# Patient Record
Sex: MALE | Race: WHITE | NOT HISPANIC OR LATINO | Employment: OTHER | URBAN - METROPOLITAN AREA
[De-identification: names, ages, dates, MRNs, and addresses within clinical notes are randomized per-mention and may not be internally consistent; named-entity substitution may affect disease eponyms.]

---

## 2017-10-26 ENCOUNTER — APPOINTMENT (OUTPATIENT)
Dept: PHYSICAL THERAPY | Facility: HOSPITAL | Age: 72
End: 2017-10-26

## 2017-10-27 ENCOUNTER — HOSPITAL ENCOUNTER (OUTPATIENT)
Dept: PHYSICAL THERAPY | Facility: HOSPITAL | Age: 72
Setting detail: THERAPIES SERIES
Discharge: HOME | End: 2017-10-27

## 2017-10-27 DIAGNOSIS — M54.2 NECK PAIN: Primary | ICD-10-CM

## 2017-10-27 DIAGNOSIS — R26.9 ALTERED GAIT: ICD-10-CM

## 2017-10-27 DIAGNOSIS — M25.50 ARTHRALGIA, UNSPECIFIED JOINT: ICD-10-CM

## 2017-10-27 DIAGNOSIS — M15.9 OSTEOARTHROSIS, GENERALIZED, INVOLVING MULTIPLE SITES: ICD-10-CM

## 2017-10-27 PROCEDURE — 97162 PT EVAL MOD COMPLEX 30 MIN: CPT

## 2017-10-27 PROCEDURE — 97110 THERAPEUTIC EXERCISES: CPT

## 2017-10-27 PROCEDURE — G8982 BODY POS GOAL STATUS: HCPCS

## 2017-10-27 PROCEDURE — G8981 BODY POS CURRENT STATUS: HCPCS
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
## 2017-10-31 ENCOUNTER — HOSPITAL ENCOUNTER (OUTPATIENT)
Dept: PHYSICAL THERAPY | Facility: HOSPITAL | Age: 72
Setting detail: THERAPIES SERIES
Discharge: HOME | End: 2017-10-31

## 2017-10-31 DIAGNOSIS — M54.2 NECK PAIN: Primary | ICD-10-CM

## 2017-10-31 DIAGNOSIS — M15.9 OSTEOARTHROSIS, GENERALIZED, INVOLVING MULTIPLE SITES: ICD-10-CM

## 2017-10-31 DIAGNOSIS — R26.9 ALTERED GAIT: ICD-10-CM

## 2017-10-31 DIAGNOSIS — M25.50 ARTHRALGIA, UNSPECIFIED JOINT: ICD-10-CM

## 2017-10-31 PROCEDURE — 97140 MANUAL THERAPY 1/> REGIONS: CPT

## 2017-11-02 ENCOUNTER — HOSPITAL ENCOUNTER (OUTPATIENT)
Dept: PHYSICAL THERAPY | Facility: HOSPITAL | Age: 72
Setting detail: THERAPIES SERIES
Discharge: HOME | End: 2017-11-02

## 2017-11-02 DIAGNOSIS — M15.9 OSTEOARTHROSIS, GENERALIZED, INVOLVING MULTIPLE SITES: ICD-10-CM

## 2017-11-02 DIAGNOSIS — M54.2 NECK PAIN: Primary | ICD-10-CM

## 2017-11-02 DIAGNOSIS — R26.9 ALTERED GAIT: ICD-10-CM

## 2017-11-02 DIAGNOSIS — M25.50 ARTHRALGIA, UNSPECIFIED JOINT: ICD-10-CM

## 2017-11-02 PROCEDURE — 97140 MANUAL THERAPY 1/> REGIONS: CPT

## 2017-11-02 NOTE — THERAPY TREATMENT NOTE
"    Outpatient Physical Therapy Ortho Treatment Note  [facility]     Patient Name: Roque Lepe  : 1945  MRN: [id number]  Today's Date: 2017      Visit Date: 2017    Visit Dx:    ICD-10-CM ICD-9-CM   1. Neck pain M54.2 723.1   2. Arthralgia, unspecified joint M25.50 719.40   3. Osteoarthrosis, generalized, involving multiple sites M15.9 715.09   4. Altered gait R26.9 781.2       There is no problem list on file for this patient.       Past Medical History:   Diagnosis Date   • Arthritis    • Coronary artery disease    • Diabetes mellitus    • HA (headache)    • Hypertension         Past Surgical History:   Procedure Laterality Date   • JOINT REPLACEMENT Bilateral     JENNIFER   • PACEMAKER IMPLANTATION               PT Ortho       17 1100    Subjective Comments    Subjective Comments Patient reports \"it may be a hair better.\"  \"I slept a little better last night.\"    -LN    Precautions and Contraindications    Precautions/Limitations pacemaker  -LN    Precautions NO ES or US SECONDARY TO PACEMAKER  -LN    Contraindications NO ES SECONDARY TO PACEMAKER  -LN    Subjective Pain    Able to rate subjective pain? yes  -LN    Pre-Treatment Pain Level 5  -LN    Subjective Pain Comment Occasional head ache this morning but not bad per patient.    -LN      10/31/17 1109    Subjective Comments    Subjective Comments Patient reports feeling \"so-so\". Patient woke up about at 3:00 am and c/o significant pain in neck and shoulders and he couldn't get back to sleep. \"I do that every night.\"  \"The tape may have helped but every so often it really itched.   -LN    Precautions and Contraindications    Precautions/Limitations pacemaker  -LN    Precautions NO ES SECONDARY TO PACEMAKER  -LN    Contraindications NO ES SECONDARY TO PACEMAKER  -LN    Subjective Pain    Able to rate subjective pain? yes  -LN    Pre-Treatment Pain Level 6  -LN    Subjective Pain Comment No c/o HA today.   -LN    Posture/Observations    " "Posture/Observations Comments Removed tape and no redness noted or skin irritation noted.   -LN    Neck    Flexion AROM Deficit 75%  -LN    Extension AROM Deficit 75%  -LN    Lt Lat Flexion AROM Deficit 75%  -LN    Rt Lateral Flexion AROM Deficit 50%  -LN    Lt Rotation AROM Deficit 50%  -LN    Rt Rotation AROM Deficit 50%  -LN      User Key  (r) = Recorded By, (t) = Taken By, (c) = Cosigned By    Initials Name Provider Type    ANTONIO Cates, PT Physical Therapist                            PT Assessment/Plan       11/02/17 1200       PT Assessment    Assessment Comments Patient with overall decreased c/o pain and decreased HA. Patient still with moderate muscle guading with tenderness/trigger points along right UT/midscapula area/OA area. Nominal muscle guarding noted on left side. He is doing well with HEP. CROM improving but still decreased left cervical SB and left cervical rotation. He was able to SB to left further with Kinesiotape on right side.   -LN     PT Plan    PT Frequency 2x/week  -LN     PT Plan Comments Continue per P.O.C. Assess benefit & tolerance to Kinesiotape. Progress with exercises as tolerated.   -LN       User Key  (r) = Recorded By, (t) = Taken By, (c) = Cosigned By    Initials Name Provider Type    ANTONIO Cates, PT Physical Therapist                Modalities       11/02/17 1100          Moist Heat    MH Applied Yes  -LN      Location Bilateral UT/shoulders and back with patient supine in hooklying.  -LN      Rx Minutes 15 mins  -LN      MH Prior to Rx Yes  -LN      Other Treatment Provided    Taping / Bracing 1 \"I\" strip applied for right UT inhibition and 1 \"I\" strip applied along right midscapula muscles. Patient to leave tape on up to 5 days but to trim/remove tape as needed or if it causes a lot of itching. Cautioned patient to remove tape easily if he does decide to take it off.   -LN        User Key  (r) = Recorded By, (t) = Taken By, (c) = Cosigned By    " "Initials Name Provider Type    ANTONIO Cates, PT Physical Therapist                Exercises       11/02/17 1100          Subjective Comments    Subjective Comments Patient reports \"it may be a hair better.\"  \"I slept a little better last night.\"    -LN      Subjective Pain    Able to rate subjective pain? yes  -LN      Pre-Treatment Pain Level 5  -LN      Subjective Pain Comment Ocassional head ache this morning.   -LN      Exercise 1    Exercise Name 1 Verbally reviewed HEP.   -LN      Cueing 1 --  -LN      Reps 1 --  -LN      Time (Seconds) 1 --  -LN      Exercise 2    Exercise Name 2 --  -LN      Reps 2 --  -LN      Exercise 3    Exercise Name 3 --  -LN      Reps 3 --  -LN      Exercise 4    Exercise Name 4 --  -LN      Reps 4 --  -LN      Time (Seconds) 4 --  -LN        User Key  (r) = Recorded By, (t) = Taken By, (c) = Cosigned By    Initials Name Provider Type    ANTONIO Cates, PT Physical Therapist                        Manual Rx (last 36 hours)      Manual Treatments       11/02/17 1100          Manual Rx 1    Manual Rx 1 Location Bilateral UT/levator/cervical PS/OA area with patient seated in chair  -LN      Manual Rx 1 Type STM/trigger point massage with patient seated in chair  -LN      Manual Rx 1 Duration 10 minutes  -LN      Manual Rx 2    Manual Rx 2 Location gentle manual Cervical traction in sitting  -LN      Manual Rx 2 Duration 5 x 10 seconds  -LN      Manual Rx 3    Manual Rx 3 Location AA cervical rotation   seated  -LN      Manual Rx 3 Duration 3 x each  -LN      Manual Rx 4    Manual Rx 4 Location AA cervical SB   seated  -LN      Manual Rx 4 Duration 3 x each  -LN      Manual Rx 5    Manual Rx 5 Location active cervical flexion  -LN      Manual Rx 5 Duration 3 x  -LN        User Key  (r) = Recorded By, (t) = Taken By, (c) = Cosigned By    Initials Name Provider Type    ANTONIO Cates, PT Physical Therapist                    Therapy Education       " 11/02/17 1221          Therapy Education    Given HEP;Symptoms/condition management;Pain management  -LN      Program Reinforced  -LN      How Provided Verbal;Demonstration  -LN      Provided to Patient  -LN      Level of Understanding Teach back education performed;Verbalized;Demonstrated  -LN        User Key  (r) = Recorded By, (t) = Taken By, (c) = Cosigned By    Initials Name Provider Type    LN Daphne Cates, PT Physical Therapist                Time Calculation:   Start Time: 1115  Stop Time: 1210  Time Calculation (min): 55 min    Therapy Charges for Today     Code Description Service Date Service Provider Modifiers Qty    63532543807 HC PT HOT OR COLD PACK TREAT MCARE 11/2/2017 Daphne Cates, PT GP 1    61905503759 HC PT MANUAL THERAPY EA 15 MIN 11/2/2017 Daphne Cates, PT GP 2                    Daphne Cates, PT  11/2/2017

## 2017-11-07 ENCOUNTER — HOSPITAL ENCOUNTER (OUTPATIENT)
Dept: PHYSICAL THERAPY | Facility: HOSPITAL | Age: 72
Setting detail: THERAPIES SERIES
Discharge: HOME | End: 2017-11-07

## 2017-11-07 DIAGNOSIS — M54.2 NECK PAIN: Primary | ICD-10-CM

## 2017-11-07 DIAGNOSIS — M15.9 OSTEOARTHROSIS, GENERALIZED, INVOLVING MULTIPLE SITES: ICD-10-CM

## 2017-11-07 DIAGNOSIS — M25.50 ARTHRALGIA, UNSPECIFIED JOINT: ICD-10-CM

## 2017-11-07 PROCEDURE — 97140 MANUAL THERAPY 1/> REGIONS: CPT

## 2017-11-07 NOTE — THERAPY TREATMENT NOTE
"    Outpatient Physical Therapy Ortho Treatment Note   [place]     Patient Name: Roque Lepe  : 1945  MRN: [id number]  Today's Date: 2017      Visit Date: 2017    Visit Dx:    ICD-10-CM ICD-9-CM   1. Neck pain M54.2 723.1   2. Arthralgia, unspecified joint M25.50 719.40   3. Osteoarthrosis, generalized, involving multiple sites M15.9 715.09       There is no problem list on file for this patient.       Past Medical History:   Diagnosis Date   • Arthritis    • Coronary artery disease    • Diabetes mellitus    • HA (headache)    • Hypertension         Past Surgical History:   Procedure Laterality Date   • JOINT REPLACEMENT Bilateral     JENNIFER   • PACEMAKER IMPLANTATION               PT Ortho       17 1100    Subjective Comments    Subjective Comments Pt reports he has been awake since 3 am because he can't tolerate lying down; pt reports his skin has been \"a little itchy\"; pt denies any questions with HEP  -    Precautions and Contraindications    Precautions/Limitations pacemaker  -    Precautions NO ES or US SECONDARY TO PACEMAKER  -    Contraindications NO ES SECONDARY TO PACEMAKER  -    Subjective Pain    Able to rate subjective pain? yes  -    Pre-Treatment Pain Level 6  -    Subjective Pain Comment Pt reports his pain is constant and never goes below a 5/10  -    Posture/Observations    Posture/Observations Comments Tape removed - no irritation visible  -      User Key  (r) = Recorded By, (t) = Taken By, (c) = Cosigned By    Initials Name Provider Type     Rolly Wolff, PTA Physical Therapy Assistant                            PT Assessment/Plan       17 1204       PT Assessment    Assessment Comments Pt with decreased tightness noted especially (R) UT after treatment; pt reporting being \"a hair better\" ; did not reapply tape this visit in order to decrease risk of skin irritation following pt's complaints of itching - plan to reapply at next visit  -     PT " Plan    PT Plan Comments Cont per POC - reapply kinesiotape next visit if pt requests  -       User Key  (r) = Recorded By, (t) = Taken By, (c) = Cosigned By    Initials Name Provider Type     Rolly Wolff PTA Physical Therapy Assistant                Modalities       11/07/17 1100          Moist Heat    Location Bilateral UT/shoulders and back with patient supine in hooklying.  -      Rx Minutes 15 mins  -[facility] Prior to Rx Yes  -        User Key  (r) = Recorded By, (t) = Taken By, (c) = Cosigned By    Initials Name Provider Type     Rolly Wolff PTA Physical Therapy Assistant                                  Manual Rx (last 36 hours)      Manual Treatments       11/07/17 1100          Manual Rx 1    Manual Rx 1 Location Bilateral UT/levator/cervical PS/OA area with patient seated in chair  -      Manual Rx 1 Type STM/trigger point massage with patient seated in chair  -      Manual Rx 1 Duration 10 minutes  -      Manual Rx 2    Manual Rx 2 Location gentle manual Cervical traction in sitting  -      Manual Rx 2 Duration 5 x 10 seconds  -      Manual Rx 3    Manual Rx 3 Location AA cervical rotation   seated  -      Manual Rx 3 Duration 3 x each  -      Manual Rx 4    Manual Rx 4 Location AA cervical SB   seated  -      Manual Rx 4 Duration 3 x each  -      Manual Rx 5    Manual Rx 5 Location active cervical flexion  -      Manual Rx 5 Duration 3 x  -        User Key  (r) = Recorded By, (t) = Taken By, (c) = Cosigned By    Initials Name Provider Type     Rolly Shady Wolff PTA Physical Therapy Assistant                    Therapy Education       11/07/17 1204          Therapy Education    Given HEP;Symptoms/condition management  -      Program Reinforced  -      How Provided Verbal  -      Provided to Patient  -      Level of Understanding Teach back education performed;Verbalized  -        User Key  (r) = Recorded By, (t) = Taken By, (c) = Cosigned By    Initials  Name Provider Type     Rolly Wolff PTA Physical Therapy Assistant                Time Calculation:   Start Time: 1102  Stop Time: 1157  Time Calculation (min): 55 min    Therapy Charges for Today     Code Description Service Date Service Provider Modifiers Qty    21297515676 HC PT MANUAL THERAPY EA 15 MIN 11/7/2017 Rolly Wolff PTA GP 1    41774255681 HC PT HOT OR COLD PACK TREAT [facility]ARE 11/7/2017 Rolly Wolff PTA GP 1                    Rolly Wolff PTA  11/7/2017

## 2017-11-09 ENCOUNTER — HOSPITAL ENCOUNTER (OUTPATIENT)
Dept: PHYSICAL THERAPY | Facility: HOSPITAL | Age: 72
Setting detail: THERAPIES SERIES
Discharge: HOME | End: 2017-11-09

## 2017-11-09 DIAGNOSIS — M25.50 ARTHRALGIA, UNSPECIFIED JOINT: ICD-10-CM

## 2017-11-09 DIAGNOSIS — R26.9 ALTERED GAIT: ICD-10-CM

## 2017-11-09 DIAGNOSIS — M54.2 NECK PAIN: Primary | ICD-10-CM

## 2017-11-09 DIAGNOSIS — M15.9 OSTEOARTHROSIS, GENERALIZED, INVOLVING MULTIPLE SITES: ICD-10-CM

## 2017-11-09 PROCEDURE — 97140 MANUAL THERAPY 1/> REGIONS: CPT
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
## 2017-11-14 ENCOUNTER — HOSPITAL ENCOUNTER (OUTPATIENT)
Dept: PHYSICAL THERAPY | Facility: HOSPITAL | Age: 72
Setting detail: THERAPIES SERIES
Discharge: HOME | End: 2017-11-14

## 2017-11-14 DIAGNOSIS — M54.2 NECK PAIN: Primary | ICD-10-CM

## 2017-11-14 PROCEDURE — 97110 THERAPEUTIC EXERCISES: CPT

## 2017-11-14 PROCEDURE — 97140 MANUAL THERAPY 1/> REGIONS: CPT

## 2017-11-14 NOTE — THERAPY TREATMENT NOTE
"    Outpatient Physical Therapy Ortho Treatment Note   [place]     Patient Name: Roque Lepe  : 1945  MRN: [id number]  Today's Date: 2017      Visit Date: 2017    Visit Dx:    ICD-10-CM ICD-9-CM   1. Neck pain M54.2 723.1       There is no problem list on file for this patient.       Past Medical History:   Diagnosis Date   • Arthritis    • Coronary artery disease    • Diabetes mellitus    • HA (headache)    • Hypertension         Past Surgical History:   Procedure Laterality Date   • JOINT REPLACEMENT Bilateral     JENNIFER   • PACEMAKER IMPLANTATION               PT Ortho       17 1400    Subjective Comments    Subjective Comments \"I saw the doctor and she is going to have me see a neck specialist.\"   \"I feel better after therapy and after I take a hot shower but then the muscles tighten back up.\"  \"She said it was okay to use heat on my hip.\"  -LN    Precautions and Contraindications    Precautions/Limitations pacemaker  -LN    Precautions NO ES or US SECONDARY TO PACEMAKER  -LN    Contraindications NO ES SECONDARY TO PACEMAKER  -LN    Subjective Pain    Able to rate subjective pain? yes  -LN    Pre-Treatment Pain Level 4  -LN    Subjective Pain Comment Patient reports that his hip isn't hurting right now-\" it hurts when I walk a lot or stand a lot.\"   -LN    Posture/Observations    Posture/Observations Comments Tape removed - no irritation visible.   -LN      User Key  (r) = Recorded By, (t) = Taken By, (c) = Cosigned By    Initials Name Provider Type    LN Daphne Cates, PT Physical Therapist                            PT Assessment/Plan       17 1400       PT Assessment    Assessment Comments Patient with overall decreased muscle tightness but minimal to moderate still persists with a few tender trigger points along right UT and midscapula area.  He is tolerating gentle CROM and stretching well with only c/o minimal pain/discomfort right UT/levator area.   -LN     PT " "Plan    PT Frequency 2x/week  -LN     PT Plan Comments Continue per P.O.C.  No new orders on hip yet. Patient waiting to hear from his MD about when his appointment with neck specialist will be.    -LN       User Key  (r) = Recorded By, (t) = Taken By, (c) = Cosigned By    Initials Name Provider Type    ANTONIO Cates, PT Physical Therapist                Modalities       11/14/17 1400          Moist Heat    MH Applied Yes  -LN      Location Bilateral UT/shoulders and back with patient supine in hooklying.   with HOB elevated  -LN      Rx Minutes 15 mins  -LN      [facility] Prior to Rx Yes  -LN        User Key  (r) = Recorded By, (t) = Taken By, (c) = Cosigned By    Initials Name Provider Type    ANTONIO Cates, PT Physical Therapist                Exercises       11/14/17 1400          Subjective Comments    Subjective Comments \"I saw the doctor and she is going to have me see a neck specialist.\"   \"I feel better after therapy and after I take a hot shower but then the muscles tighten back up.\"   -LN      Subjective Pain    Able to rate subjective pain? yes  -LN      Pre-Treatment Pain Level 4  -LN      Subjective Pain Comment Patient reports that his hip isn't hurting right now- it hurts when I walk a lot or stand a lot.\"   -LN      Exercise 1    Exercise Name 1 Verbally reviewed HEP.   -LN      Exercise 2    Exercise Name 2 scapular adduction  -LN      Reps 2 5  -LN      Time (Seconds) 2 5 seconds  -LN      Exercise 3    Exercise Name 3 right posterior capsule stretch  -LN      Reps 3 5  -LN      Time (Seconds) 3 5 seconds  -LN        User Key  (r) = Recorded By, (t) = Taken By, (c) = Cosigned By    Initials Name Provider Type    ANTONIO Cates PT Physical Therapist                        Manual Rx (last 36 hours)      Manual Treatments       11/14/17 1500          Manual Rx 1    Manual Rx 1 Location Bilateral UT/levator/cervical PS/OA area with patient seated in chair  -LN      Manual " Rx 1 Type STM/trigger point massage with patient seated in chair  -LN      Manual Rx 1 Duration 10 minutes  -LN      Manual Rx 2    Manual Rx 2 Location gentle manual Cervical traction in sitting  -LN      Manual Rx 2 Duration 6 x 10 seconds  -LN      Manual Rx 3    Manual Rx 3 Location AA cervical rotation   seated  -LN      Manual Rx 3 Duration 3 x each  -LN      Manual Rx 4    Manual Rx 4 Location AA cervical SB   seated; with gentle passive UT stretch  -LN      Manual Rx 4 Duration 3 x each  -LN      Manual Rx 5    Manual Rx 5 Location active cervical flexion  -LN      Manual Rx 5 Duration 3 x  -LN      Manual Rx 6    Manual Rx 6 Location AA levator stretch  -LN      Manual Rx 6 Duration 3 x each x 5 seconds   -LN        User Key  (r) = Recorded By, (t) = Taken By, (c) = Cosigned By    Initials Name Provider Type    LN Daphne Cates, PT Physical Therapist                    Therapy Education       11/14/17 1549          Therapy Education    Given HEP;Symptoms/condition management;Pain management  -LN      Program New   added right posterior capsule stretch  -LN      How Provided Verbal;Demonstration;Written  -LN      Provided to Patient  -LN      Level of Understanding Teach back education performed;Verbalized;Demonstrated  -LN        User Key  (r) = Recorded By, (t) = Taken By, (c) = Cosigned By    Initials Name Provider Type    LN Daphne Cates, PT Physical Therapist                Time Calculation:   Start Time: 1425  Stop Time: 1530  Time Calculation (min): 65 min    Therapy Charges for Today     Code Description Service Date Service Provider Modifiers Qty    65075601942 HC PT HOT OR COLD PACK TREAT [facility] 11/14/2017 Daphne Cates, PT GP 1    23246024843 HC PT MANUAL THERAPY EA 15 MIN 11/14/2017 Daphne Cates, PT GP 1    41071920355 HC PT THER PROC EA 15 MIN 11/14/2017 Daphne Cates, PT GP 1                    Daphne Cates, PT  11/14/2017

## 2017-11-16 ENCOUNTER — HOSPITAL ENCOUNTER (OUTPATIENT)
Dept: PHYSICAL THERAPY | Facility: HOSPITAL | Age: 72
Setting detail: THERAPIES SERIES
Discharge: HOME | End: 2017-11-16

## 2017-11-16 DIAGNOSIS — M15.9 OSTEOARTHROSIS, GENERALIZED, INVOLVING MULTIPLE SITES: ICD-10-CM

## 2017-11-16 DIAGNOSIS — M25.50 ARTHRALGIA, UNSPECIFIED JOINT: ICD-10-CM

## 2017-11-16 DIAGNOSIS — R26.9 ALTERED GAIT: ICD-10-CM

## 2017-11-16 DIAGNOSIS — M54.2 NECK PAIN: Primary | ICD-10-CM

## 2017-11-16 PROCEDURE — 97140 MANUAL THERAPY 1/> REGIONS: CPT

## 2017-11-16 PROCEDURE — 97110 THERAPEUTIC EXERCISES: CPT

## 2017-11-16 NOTE — THERAPY TREATMENT NOTE
"    Outpatient Physical Therapy Ortho Treatment Note  [facility]     Patient Name: Roque Lepe  : 1945  MRN: [id number]  Today's Date: 2017      Visit Date: 2017    Visit Dx:    ICD-10-CM ICD-9-CM   1. Neck pain M54.2 723.1   2. Arthralgia, unspecified joint M25.50 719.40   3. Osteoarthrosis, generalized, involving multiple sites M15.9 715.09   4. Altered gait R26.9 781.2       There is no problem list on file for this patient.       Past Medical History:   Diagnosis Date   • Arthritis    • Coronary artery disease    • Diabetes mellitus    • HA (headache)    • Hypertension         Past Surgical History:   Procedure Laterality Date   • JOINT REPLACEMENT Bilateral     JENNIFER   • PACEMAKER IMPLANTATION               PT Ortho       17 1100    Subjective Comments    Subjective Comments \"I'm about the same; I'm not any worse.\" \"I felt better for a little while after the last visit.\"   -LN    Precautions and Contraindications    Precautions/Limitations pacemaker  -LN    Precautions NO ES or US SECONDARY TO PACEMAKER  -LN    Contraindications NO ES SECONDARY TO PACEMAKER  -LN    Subjective Pain    Able to rate subjective pain? yes  -LN    Pre-Treatment Pain Level 4  -LN    Subjective Pain Comment \"My hip is about the same.\"    Patient reports muscles feel looser after treatment.   -LN    Neck    Flexion AROM Deficit 75%  -LN    Extension AROM Deficit 75%  -LN    Lt Lat Flexion AROM Deficit 75%  -LN    Rt Lateral Flexion AROM Deficit 50%  -LN    Lt Rotation AROM Deficit 75%  -LN    Rt Rotation AROM Deficit 50%  -LN      17 1400    Subjective Comments    Subjective Comments \"I saw the doctor and she is going to have me see a neck specialist.\"   \"I feel better after therapy and after I take a hot shower but then the muscles tighten back up.\"   -LN    Precautions and Contraindications    Precautions/Limitations pacemaker  -LN    Precautions NO ES or US SECONDARY TO PACEMAKER  -LN    " "Contraindications NO ES SECONDARY TO PACEMAKER  -LN    Subjective Pain    Able to rate subjective pain? yes  -LN    Pre-Treatment Pain Level 4  -LN    Subjective Pain Comment Patient reports that his hip isn't hurting right now- it hurts when I walk a lot or stand a lot.\"   -LN    Posture/Observations    Posture/Observations Comments Tape removed - no irritation visible.   -LN      User Key  (r) = Recorded By, (t) = Taken By, (c) = Cosigned By    Initials Name Provider Type    ANTONIO Cates, PT Physical Therapist                            PT Assessment/Plan       11/16/17 1200       PT Assessment    Assessment Comments Patient with no significant change in reported symptoms but CROM is improved except still limited in right sidebending and right rotation. Overall decreased muscle tightness, but minimal persists with tender trigger points noted right UT and MT. Decreased tightness after treatment.   -LN     PT Plan    PT Frequency 1x/week;2x/week  -LN     PT Plan Comments Continue per P.O.C.  Assess tolerance and benefit of Kinesiotape.  -LN       User Key  (r) = Recorded By, (t) = Taken By, (c) = Cosigned By    Initials Name Provider Type    ANTONIO Cates, PT Physical Therapist                Modalities       11/16/17 1100          Moist Heat    MH Applied Yes  -LN      Location Bilateral UT/shoulders and back with patient supine in hooklying.   with HOB elevated  -LN      Rx Minutes 15 mins  -LN      MH Prior to Rx Yes  -LN      Other Treatment Provided    Taping / Bracing 1 \"I\" strip applied for right UT inhibition and 1 \"I\" strip applied along right midscapula muscles.  Patient to leave tape on up to 5 days but to trim/remove tape as needed or if it causes a lot of itching. Cautioned patient to remove tape easily if he does decide to take it off.   -LN        User Key  (r) = Recorded By, (t) = Taken By, (c) = Cosigned By    Initials Name Provider Type    ANTONIO Cates PT " "Physical Therapist                Exercises       11/16/17 1100          Subjective Comments    Subjective Comments \"I'm about the same; I'm not any worse.\" \"I felt better for a little while after the last visit.\"   -LN      Subjective Pain    Able to rate subjective pain? yes  -LN      Pre-Treatment Pain Level 4  -LN      Subjective Pain Comment \"My hip is about the same.\"    Patient reports muscles feel looser after treatment.   -LN      Exercise 1    Exercise Name 1 active cervical flexion  -LN      Reps 1 5  -LN      Time (Seconds) 1 5 seconds  -LN      Exercise 2    Exercise Name 2 active cervical rotation  -LN      Reps 2 5  -LN      Time (Seconds) 2 5 seconds  -LN      Exercise 3    Exercise Name 3 active cervical SB  -LN      Reps 3 5  -LN      Exercise 4    Exercise Name 4 levator scapula stretch  -LN      Reps 4 3  -LN      Time (Seconds) 4 5 seconds  -LN      Exercise 5    Exercise Name 5 scapula adduction  -LN      Reps 5 5  -LN      Time (Seconds) 5 5 seconds  -LN      Exercise 6    Exercise Name 6 Posterior capsule stretch  -LN      Reps 6 5   each  -LN      Time (Seconds) 6 5 seconds each  -LN        User Key  (r) = Recorded By, (t) = Taken By, (c) = Cosigned By    Initials Name Provider Type    LN Daphne Cates, PT Physical Therapist                        Manual Rx (last 36 hours)      Manual Treatments       11/16/17 1100          Manual Rx 1    Manual Rx 1 Location Bilateral UT/levator/cervical PS/OA area with patient seated in chair  -LN      Manual Rx 1 Type STM/trigger point massage with patient seated in chair  -LN      Manual Rx 1 Duration 10 minutes  -LN      Manual Rx 2    Manual Rx 2 Location gentle manual Cervical traction in sitting  -LN      Manual Rx 2 Duration 6 x 10 seconds  -LN      Manual Rx 3    Manual Rx 3 Location AA cervical rotation   seated  -LN      Manual Rx 3 Duration 3 x each  -LN      Manual Rx 4    Manual Rx 4 Location AA cervical SB   seated; with gentle " passive UT stretch  -LN      Manual Rx 4 Duration 3 x each  -LN      Manual Rx 5    Manual Rx 5 Location AA cervical flexion   gentle passive stretch at end-range  -LN      Manual Rx 5 Duration 3 x  -LN      Manual Rx 6    Manual Rx 6 Location AA levator stretch  -LN      Manual Rx 6 Duration 3 x each x 5 seconds   -LN      Manual Rx 7    Manual Rx 7 Location Passive cervical side glides as tolerated  -LN      Manual Rx 7 Duration 2 minutes   -LN        User Key  (r) = Recorded By, (t) = Taken By, (c) = Cosigned By    Initials Name Provider Type    ANTONIO Cates, PT Physical Therapist                PT OP Goals       11/16/17 1300       PT Short Term Goals    STG Date to Achieve 11/10/17  -LN     STG 1 Patient to verbally report decreased pain in neck and shoulders to <5/10 with ADLs and everyday activities.  -LN     STG 1 Progress Met  -LN     STG 1 Progress Comments Patient rates pain at 4/10.   -LN     STG 2 Patient to have improved CROM by 25% each plane to allow for improved neck motion with driving and ADLs.  -LN     STG 2 Progress Partially Met  -LN     STG 3 Patient to have decreased c/o HA by 25-50%.   -LN     STG 3 Progress Ongoing;Progressing  -LN     STG 3 Progress Comments Patient still with c/o occasional HA but reports that they don't last very long.   -LN     STG 4 Patient to have decreased muscle guarding in neck/UT/midscapula area to minimal to nominal with improved tolerance to massage and stretching.   -LN     STG 4 Progress Partially Met  -LN     STG 4 Progress Comments Decreased to minimal; but still very tender with trigger points noted right UT and MT area.   -LN       User Key  (r) = Recorded By, (t) = Taken By, (c) = Cosigned By    Initials Name Provider Type    ANTONIO Cates, PT Physical Therapist                Therapy Education       11/16/17 3309          Therapy Education    Education Details Patient to continue with HEP as tolerated.   -LN      Given  HEP;Symptoms/condition management;Pain management  -LN      Program Reinforced  -LN      How Provided Verbal;Demonstration  -LN      Provided to Patient  -LN      Level of Understanding Teach back education performed;Verbalized;Demonstrated  -LN        User Key  (r) = Recorded By, (t) = Taken By, (c) = Cosigned By    Initials Name Provider Type    LN Daphne Cates, PT Physical Therapist                Time Calculation:   Start Time: 1130  Stop Time: 1235  Time Calculation (min): 65 min    Therapy Charges for Today     Code Description Service Date Service Provider Modifiers Qty    48658212918  PT HOT OR COLD PACK TREAT MCARE 11/16/2017 Daphne Cates, PT GP 1    71939727567 HC PT MANUAL THERAPY EA 15 MIN 11/16/2017 Daphne Cates, PT GP 1    [id number]  PT THER PROC EA 15 MIN 11/16/2017 Daphne Cates, PT GP 1                    Daphne Cates, PT  11/16/2017

## 2017-11-21 ENCOUNTER — HOSPITAL ENCOUNTER (OUTPATIENT)
Dept: PHYSICAL THERAPY | Facility: HOSPITAL | Age: 72
Setting detail: THERAPIES SERIES
Discharge: HOME | End: 2017-11-21

## 2017-11-21 DIAGNOSIS — R26.9 ALTERED GAIT: ICD-10-CM

## 2017-11-21 DIAGNOSIS — M25.50 ARTHRALGIA, UNSPECIFIED JOINT: ICD-10-CM

## 2017-11-21 DIAGNOSIS — M54.2 NECK PAIN: Primary | ICD-10-CM

## 2017-11-21 DIAGNOSIS — M15.9 OSTEOARTHROSIS, GENERALIZED, INVOLVING MULTIPLE SITES: ICD-10-CM

## 2017-11-21 PROCEDURE — 97110 THERAPEUTIC EXERCISES: CPT

## 2017-11-21 PROCEDURE — 97140 MANUAL THERAPY 1/> REGIONS: CPT

## 2017-11-21 NOTE — THERAPY TREATMENT NOTE
Outpatient Physical Therapy Ortho Treatment Note  [facility]     Patient Name: Roque Lepe  : 1945  MRN: [id number]  Today's Date: 2017      Visit Date: 2017    Visit Dx:    ICD-10-CM ICD-9-CM   1. Neck pain M54.2 723.1   2. Arthralgia, unspecified joint M25.50 719.40   3. Osteoarthrosis, generalized, involving multiple sites M15.9 715.09   4. Altered gait R26.9 781.2       There is no problem list on file for this patient.       Past Medical History:   Diagnosis Date   • Arthritis    • Coronary artery disease    • Diabetes mellitus    • HA (headache)    • Hypertension         Past Surgical History:   Procedure Laterality Date   • JOINT REPLACEMENT Bilateral     JENNIFER   • PACEMAKER IMPLANTATION                               PT Assessment/Plan       17 1209       PT Assessment    Assessment Comments Pt continues with trigger points and tightness (R) UT/MT but with improvement after treatment; pt reporting his CROM felt more relaxed after session  -     PT Plan    PT Plan Comments Cont per POC  -       User Key  (r) = Recorded By, (t) = Taken By, (c) = Cosigned By    Initials Name Provider Type     Rolly Wolff PTA Physical Therapy Assistant                Modalities       17 1100          Subjective Comments    Subjective Comments Pt states he is about the same but feels he may be doing a little better overall; reports the tape became quite itchy this round and one strip is starting to roll up - pt request to hold tape as in past due to itching  -      Moist Heat    Location Bilateral UT/shoulders and back with patient supine in hooklying.   with HOB elevated  -      Rx Minutes 15 mins  -      MH Prior to Rx Yes  -        User Key  (r) = Recorded By, (t) = Taken By, (c) = Cosigned By    Initials Name Provider Type     Rolly Wolff PTA Physical Therapy Assistant                Exercises       17 1100          Subjective Comments    Subjective Comments Pt  states he is about the same but feels he may be doing a little better overall; reports the tape became quite itchy this round and one strip is starting to roll up - pt request to hold tape as in past due to itching  -      Exercise 1    Exercise Name 1 active cervical flexion  -      Reps 1 5  -      Time (Seconds) 1 5 seconds  -      Exercise 2    Exercise Name 2 active cervical rotation  -      Reps 2 5  -      Time (Seconds) 2 5 seconds  -      Exercise 3    Exercise Name 3 active cervical SB  -      Reps 3 5  -      Exercise 4    Exercise Name 4 levator scapula stretch  -      Reps 4 3  -MH      Time (Seconds) 4 5 seconds  -      Exercise 5    Exercise Name 5 scapula adduction  -      Reps 5 5  -      Time (Seconds) 5 5 seconds  -      Exercise 6    Exercise Name 6 Posterior capsule stretch  -      Reps 6 5   each  -      Time (Seconds) 6 5 seconds each  -        User Key  (r) = Recorded By, (t) = Taken By, (c) = Cosigned By    Initials Name Provider Type     Rolly Wolff, PTA Physical Therapy Assistant                        Manual Rx (last 36 hours)      Manual Treatments       11/21/17 1100          Manual Rx 1    Manual Rx 1 Location Bilateral UT/levator/cervical PS/OA area with patient seated in chair  -      Manual Rx 1 Type STM/trigger point massage with patient seated in chair  -      Manual Rx 1 Duration 10 minutes  -      Manual Rx 2    Manual Rx 2 Location gentle manual Cervical traction in sitting  -      Manual Rx 2 Duration 6 x 10 seconds  -      Manual Rx 3    Manual Rx 3 Location AA cervical rotation   seated  -      Manual Rx 3 Duration 3 x each  -      Manual Rx 4    Manual Rx 4 Location AA cervical SB   seated; with gentle passive UT stretch  -      Manual Rx 4 Duration 3 x each  -      Manual Rx 5    Manual Rx 5 Location AA cervical flexion   gentle passive stretch at end-range  -      Manual Rx 5 Duration 3 x  -      Manual Rx 6     Manual Rx 6 Location AA levator stretch  -MH      Manual Rx 6 Duration 3 x each x 5 seconds   -MH        User Key  (r) = Recorded By, (t) = Taken By, (c) = Cosigned By    Initials Name Provider Type    CHARLENE Wolff PTA Physical Therapy Assistant                    Therapy Education       11/21/17 1209          Therapy Education    Given HEP  -MH      Program Reinforced  -MH      How Provided Verbal  -MH      Provided to Patient  -MH      Level of Understanding Teach back education performed;Verbalized;Demonstrated  -MH        User Key  (r) = Recorded By, (t) = Taken By, (c) = Cosigned By    Initials Name Provider Type    CHARLENE Wolff PTA Physical Therapy Assistant                Time Calculation:   Start Time: 1100  Stop Time: 1202  Time Calculation (min): 62 min    Therapy Charges for Today     Code Description Service Date Service Provider Modifiers Qty    71340841399 HC PT HOT OR COLD PACK TREAT MCARE 11/21/2017 Rolly Wolff PTA GP 1    82284081812 HC PT MANUAL THERAPY EA 15 MIN 11/21/2017 Rolly Wolff PTA GP 1    64307005118 HC PT THER PROC EA 15 MIN 11/21/2017 Rolly Wolff PTA GP 1                    Rolly Wolff PTA  11/21/2017

## 2017-11-28 ENCOUNTER — HOSPITAL ENCOUNTER (OUTPATIENT)
Dept: PHYSICAL THERAPY | Facility: HOSPITAL | Age: 72
Setting detail: THERAPIES SERIES
Discharge: HOME | End: 2017-11-28

## 2017-11-28 DIAGNOSIS — M15.9 OSTEOARTHROSIS, GENERALIZED, INVOLVING MULTIPLE SITES: ICD-10-CM

## 2017-11-28 DIAGNOSIS — M25.50 ARTHRALGIA, UNSPECIFIED JOINT: ICD-10-CM

## 2017-11-28 DIAGNOSIS — M54.2 NECK PAIN: Primary | ICD-10-CM

## 2017-11-28 PROCEDURE — 97110 THERAPEUTIC EXERCISES: CPT

## 2017-11-28 PROCEDURE — 97140 MANUAL THERAPY 1/> REGIONS: CPT
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
## 2017-11-30 ENCOUNTER — HOSPITAL ENCOUNTER (OUTPATIENT)
Dept: PHYSICAL THERAPY | Facility: HOSPITAL | Age: 72
Setting detail: THERAPIES SERIES
Discharge: HOME | End: 2017-11-30

## 2017-11-30 DIAGNOSIS — M54.2 NECK PAIN: Primary | ICD-10-CM

## 2017-11-30 DIAGNOSIS — M15.9 OSTEOARTHROSIS, GENERALIZED, INVOLVING MULTIPLE SITES: ICD-10-CM

## 2017-11-30 DIAGNOSIS — R26.9 ALTERED GAIT: ICD-10-CM

## 2017-11-30 DIAGNOSIS — M25.50 ARTHRALGIA, UNSPECIFIED JOINT: ICD-10-CM

## 2017-11-30 PROCEDURE — G8982 BODY POS GOAL STATUS: HCPCS

## 2017-11-30 PROCEDURE — G8981 BODY POS CURRENT STATUS: HCPCS

## 2017-11-30 PROCEDURE — 97140 MANUAL THERAPY 1/> REGIONS: CPT

## 2017-12-05 ENCOUNTER — DOCUMENTATION (OUTPATIENT)
Dept: PHYSICAL THERAPY | Facility: HOSPITAL | Age: 72
End: 2017-12-05

## 2017-12-05 NOTE — THERAPY TREATMENT NOTE
**MISSED VISIT  NOTE**    Pt arrived to appointment at the wrong time - no appointment available.  Pt is scheduled for 12/7/17

## 2017-12-07 ENCOUNTER — HOSPITAL ENCOUNTER (OUTPATIENT)
Dept: PHYSICAL THERAPY | Facility: HOSPITAL | Age: 72
Setting detail: THERAPIES SERIES
Discharge: HOME | End: 2017-12-07

## 2017-12-07 DIAGNOSIS — M25.50 ARTHRALGIA, UNSPECIFIED JOINT: ICD-10-CM

## 2017-12-07 DIAGNOSIS — M54.2 NECK PAIN: Primary | ICD-10-CM

## 2017-12-07 DIAGNOSIS — R26.9 ALTERED GAIT: ICD-10-CM

## 2017-12-07 DIAGNOSIS — M15.9 OSTEOARTHROSIS, GENERALIZED, INVOLVING MULTIPLE SITES: ICD-10-CM

## 2017-12-07 PROCEDURE — 97140 MANUAL THERAPY 1/> REGIONS: CPT

## 2017-12-07 NOTE — THERAPY TREATMENT NOTE
"    Outpatient Physical Therapy Ortho Treatment Note  ASHER Wiley     Patient Name: Roque Lepe  : 1945  MRN: [id number]  Today's Date: 2017      Visit Date: 2017    Visit Dx:    ICD-10-CM ICD-9-CM   1. Neck pain M54.2 723.1   2. Arthralgia, unspecified joint M25.50 719.40   3. Osteoarthrosis, generalized, involving multiple sites M15.9 715.09   4. Altered gait R26.9 781.2       There is no problem list on file for this patient.       Past Medical History:   Diagnosis Date   • Arthritis    • Coronary artery disease    • Diabetes mellitus    • HA (headache)    • Hypertension         Past Surgical History:   Procedure Laterality Date   • JOINT REPLACEMENT Bilateral     JENNIFER   • PACEMAKER IMPLANTATION               PT Ortho       17 1100    Subjective Comments    Subjective Comments \"I'm a little worse today.\" Patient reported having some dizziness after therapy last time that lasted a couple days.   -LN    Precautions and Contraindications    Precautions/Limitations pacemaker  -LN    Precautions NO ES or US SECONDARY TO PACEMAKER  -LN    Contraindications NO ES SECONDARY TO PACEMAKER  -LN    Subjective Pain    Able to rate subjective pain? yes  -LN    Pre-Treatment Pain Level 5   5-6/10  -LN    Subjective Pain Comment No headache today after session or c/o dizziness.  \"I feel better than when I came in.\"   -LN      User Key  (r) = Recorded By, (t) = Taken By, (c) = Cosigned By    Initials Name Provider Type    LN Daphne Cates, PT Physical Therapist                            PT Assessment/Plan       17 1100       PT Assessment    Assessment Comments Patient continues with c/o persistent soreness in neck, bilateral UT and midscapula areas, R>L. Overall muscle tightness/spasms is decreased  but minimal persists right UT/midscapula area; nominal on left side.  He did report feeling better after session with no c/o HA or dizziness.   -LN     PT Plan    PT Frequency 1x/week;2x/week  " "-LN     PT Plan Comments Continue per P.O.C.   -LN       User Key  (r) = Recorded By, (t) = Taken By, (c) = Cosigned By    Initials Name Provider Type    ANTONIO Cates, PT Physical Therapist                Modalities       12/07/17 1100          Moist Heat    MH Applied Yes  -LN      Location Bilateral UT/shoulders and back with patient supine in hooklying.   with HOB elevated  -LN      Rx Minutes 15 mins  -LN      MH Prior to Rx Yes  -LN      Other Treatment Provided    Taping / Bracing No Kinesiotape done today.  -LN        User Key  (r) = Recorded By, (t) = Taken By, (c) = Cosigned By    Initials Name Provider Type    ANTONIO Cates, PT Physical Therapist                Exercises       12/07/17 1100          Subjective Comments    Subjective Comments \"I'm a little worse today.\" Patient reported having some dizziness after therapy last time that lasted a couple days.   -LN      Subjective Pain    Able to rate subjective pain? yes  -LN      Pre-Treatment Pain Level 5   5-6/10  -LN      Subjective Pain Comment No headache today after session or c/o dizziness.  \"I feel better than when I came in.\"   -LN        User Key  (r) = Recorded By, (t) = Taken By, (c) = Cosigned By    Initials Name Provider Type    ANTONIO Cates, PT Physical Therapist                        Manual Rx (last 36 hours)      Manual Treatments       12/07/17 1100          Manual Rx 1    Manual Rx 1 Location Bilateral UT/levator/cervical PS/OA area with patient seated in chair  -LN      Manual Rx 1 Type STM/trigger point massage with patient seated in chair  -LN      Manual Rx 1 Duration 10 minutes  -LN      Manual Rx 2    Manual Rx 2 Location gentle manual Cervical traction in sitting  -LN      Manual Rx 2 Duration 5 x 10 seconds  -LN      Manual Rx 4    Manual Rx 4 Location AA cervical SB   seated; with gentle passive UT stretch  -LN      Manual Rx 4 Grade gentle  -LN      Manual Rx 4 Duration 2 x each  -LN      " Manual Rx 5    Manual Rx 5 Location gentle A-P mobs C7- T8 with patient seated.   at spinous processes and transverse processess  -LN      Manual Rx 5 Duration 2 mimutes  -LN      Manual Rx 7    Manual Rx 7 Location Passive cervical side glides as tolerated  -LN      Manual Rx 7 Grade gentle  -LN      Manual Rx 7 Duration 2 minutes   -LN        User Key  (r) = Recorded By, (t) = Taken By, (c) = Cosigned By    Initials Name Provider Type    ANTONIO Cates, PT Physical Therapist                    Therapy Education       12/07/17 1159          Therapy Education    Given HEP;Symptoms/condition management;Pain management  -LN      Program Reinforced  -LN      How Provided Verbal  -LN      Provided to Patient  -LN      Level of Understanding Teach back education performed;Verbalized;Demonstrated  -LN        User Key  (r) = Recorded By, (t) = Taken By, (c) = Cosigned By    Initials Name Provider Type    ANTONIO Cates, PT Physical Therapist                Time Calculation:   Start Time: 1100  Stop Time: 1150  Time Calculation (min): 50 min    Therapy Charges for Today     Code Description Service Date Service Provider Modifiers Qty    33220794953 HC PT HOT OR COLD PACK TREAT MCARE 12/7/2017 Daphne Cates, PT GP 1    23167669931 HC PT MANUAL THERAPY EA 15 MIN 12/7/2017 Daphne Cates, PT GP 2                    Daphne Cates, PT  12/7/2017

## 2017-12-12 ENCOUNTER — APPOINTMENT (OUTPATIENT)
Dept: PHYSICAL THERAPY | Facility: HOSPITAL | Age: 72
End: 2017-12-12

## 2017-12-14 ENCOUNTER — HOSPITAL ENCOUNTER (OUTPATIENT)
Dept: PHYSICAL THERAPY | Facility: HOSPITAL | Age: 72
Setting detail: THERAPIES SERIES
Discharge: HOME | End: 2017-12-14

## 2017-12-14 DIAGNOSIS — M25.50 ARTHRALGIA, UNSPECIFIED JOINT: ICD-10-CM

## 2017-12-14 DIAGNOSIS — R26.9 ALTERED GAIT: ICD-10-CM

## 2017-12-14 DIAGNOSIS — M15.9 OSTEOARTHROSIS, GENERALIZED, INVOLVING MULTIPLE SITES: ICD-10-CM

## 2017-12-14 DIAGNOSIS — M54.2 NECK PAIN: Primary | ICD-10-CM

## 2017-12-14 PROCEDURE — 97140 MANUAL THERAPY 1/> REGIONS: CPT

## 2017-12-14 NOTE — THERAPY TREATMENT NOTE
"    Outpatient Physical Therapy Ortho Treatment Note  ASHER Wiley     Patient Name: Roque Lepe  : 1945  MRN: [id number]  Today's Date: 2017      Visit Date: 2017    Visit Dx:    ICD-10-CM ICD-9-CM   1. Neck pain M54.2 723.1   2. Arthralgia, unspecified joint M25.50 719.40   3. Osteoarthrosis, generalized, involving multiple sites M15.9 715.09   4. Altered gait R26.9 781.2       There is no problem list on file for this patient.       Past Medical History:   Diagnosis Date   • Arthritis    • Coronary artery disease    • Diabetes mellitus    • HA (headache)    • Hypertension         Past Surgical History:   Procedure Laterality Date   • JOINT REPLACEMENT Bilateral     JENNIFER   • PACEMAKER IMPLANTATION               PT Ortho       17 1100    Subjective Comments    Subjective Comments \"I feel some tightness in my right shoulder and soreness in my muscle in my left hip.\"   -LN    Precautions and Contraindications    Precautions/Limitations pacemaker  -LN    Precautions NO ES or US SECONDARY TO PACEMAKER  -LN    Contraindications NO ES SECONDARY TO PACEMAKER  -LN    Subjective Pain    Able to rate subjective pain? yes  -LN    Pre-Treatment Pain Level 6   -7/10  -LN    Subjective Pain Comment No c/o headache or dizziness.   -LN      User Key  (r) = Recorded By, (t) = Taken By, (c) = Cosigned By    Initials Name Provider Type    LN Daphne Cates, PT Physical Therapist                            PT Assessment/Plan       17 1100       PT Assessment    Assessment Comments Patient with overall decreased pain level but still with moderate muscle guarding noted right UT/MT/midscapula area with tenderness and trigger points present- decreased to minimal to nominal after treatment. He continues to report feeling benefit from PT.   -LN     PT Plan    PT Frequency 1x/week;2x/week  -LN     PT Plan Comments Continue per P.O.C.  Assess benefit of CP.   -LN       User Key  (r) = Recorded By, (t) = " "Taken By, (c) = Cosigned By    Initials Name Provider Type    ANTONIO Cates, PT Physical Therapist                Modalities       12/14/17 1100          Moist Heat    MH Applied Yes  -LN      Location Bilateral UT/shoulders and back with patient supine in hooklying.   with HOB elevated  -LN      Rx Minutes 15 mins  -LN      MH Prior to Rx Yes  -LN      Ice    Ice Applied Yes  -LN      Location right UT/shoulder/midscapula area with patient seated in chair.  -LN      Rx Minutes 10 mins  -LN      Ice S/P Rx Yes  -LN      Patient reports will apply ice at home to involved area Yes   if it seems to help him  -LN        User Key  (r) = Recorded By, (t) = Taken By, (c) = Cosigned By    Initials Name Provider Type    ANTONIO Cates, PT Physical Therapist                Exercises       12/14/17 1100          Subjective Comments    Subjective Comments \"I feel some tightness in my right shoulder and soreness in my muscle in my left hip.\"   -LN      Subjective Pain    Able to rate subjective pain? yes  -LN      Pre-Treatment Pain Level 6   6-7/10  -LN      Subjective Pain Comment No c/o headache or dizziness.   -LN        User Key  (r) = Recorded By, (t) = Taken By, (c) = Cosigned By    Initials Name Provider Type    ANTONIO Cates, PT Physical Therapist                        Manual Rx (last 36 hours)      Manual Treatments       12/14/17 1100          Manual Rx 1    Manual Rx 1 Location Bilateral UT/levator/cervical PS/OA area with patient seated in chair  -LN      Manual Rx 1 Type STM/trigger point massage with patient seated in chair  -LN      Manual Rx 1 Duration 10 minutes  -LN      Manual Rx 2    Manual Rx 2 Location gentle manual Cervical traction in sitting  -LN      Manual Rx 2 Duration 6 x 10 seconds  -LN      Manual Rx 4    Manual Rx 4 Location AA cervical SB   seated; with gentle passive UT stretch  -LN      Manual Rx 4 Grade gentle  -LN      Manual Rx 4 Duration 3 x each  -LN   "    Manual Rx 5    Manual Rx 5 Location gentle A-P mobs C7- T8 with patient seated.   at spinous processes and transverse processess  -LN      Manual Rx 5 Duration 2 minutes  -LN      Manual Rx 7    Manual Rx 7 Location Passive cervical side glides as tolerated  -LN      Manual Rx 7 Grade gentle  -LN      Manual Rx 7 Duration 2 minutes   -LN        User Key  (r) = Recorded By, (t) = Taken By, (c) = Cosigned By    Initials Name Provider Type    LN Daphne Cates, PT Physical Therapist              Therapy Education  Education Details: Patient instructed to use MH and CP PRN at home.  Given: HEP, Symptoms/condition management, Pain management  Program: Reinforced  How Provided: Verbal  Provided to: Patient  Level of Understanding: Teach back education performed, Verbalized, Demonstrated              Time Calculation:   Start Time: 1100  Stop Time: 1200  Time Calculation (min): 60 min    Therapy Charges for Today     Code Description Service Date Service Provider Modifiers Qty    50249012784 HC PT HOT OR COLD PACK TREAT MCARE 12/14/2017 Daphne Cates, PT GP 2    64655974902 HC PT MANUAL THERAPY EA 15 MIN 12/14/2017 Daphne Cates, PT GP 2                    Daphne Cates, PT  12/14/2017

## 2017-12-19 ENCOUNTER — HOSPITAL ENCOUNTER (OUTPATIENT)
Dept: PHYSICAL THERAPY | Facility: HOSPITAL | Age: 72
Setting detail: THERAPIES SERIES
Discharge: HOME | End: 2017-12-19

## 2017-12-19 DIAGNOSIS — M15.9 OSTEOARTHROSIS, GENERALIZED, INVOLVING MULTIPLE SITES: ICD-10-CM

## 2017-12-19 DIAGNOSIS — R26.9 ALTERED GAIT: ICD-10-CM

## 2017-12-19 DIAGNOSIS — M25.50 ARTHRALGIA, UNSPECIFIED JOINT: ICD-10-CM

## 2017-12-19 DIAGNOSIS — M54.2 NECK PAIN: Primary | ICD-10-CM

## 2017-12-19 PROCEDURE — 97140 MANUAL THERAPY 1/> REGIONS: CPT

## 2017-12-19 NOTE — THERAPY TREATMENT NOTE
"    Outpatient Physical Therapy Ortho Treatment Note  ASHER Wiley     Patient Name: Roque Lepe  : 1945  MRN: [id number]  Today's Date: 2017      Visit Date: 2017    Visit Dx:    ICD-10-CM ICD-9-CM   1. Neck pain M54.2 723.1   2. Arthralgia, unspecified joint M25.50 719.40   3. Osteoarthrosis, generalized, involving multiple sites M15.9 715.09   4. Altered gait R26.9 781.2       There is no problem list on file for this patient.       Past Medical History:   Diagnosis Date   • Arthritis    • Coronary artery disease    • Diabetes mellitus    • HA (headache)    • Hypertension         Past Surgical History:   Procedure Laterality Date   • JOINT REPLACEMENT Bilateral     JENNIFER   • PACEMAKER IMPLANTATION               PT Ortho       17 1100    Subjective Comments    Subjective Comments \"I'm just sore.\"  \"My left hip is sore.\"  \"I guess I'm about the same.\"   -LN    Precautions and Contraindications    Precautions/Limitations seizure precautions  -LN    Precautions NO ES or US SECONDARY TO PACEMAKER  -LN    Contraindications NO ES SECONDARY TO PACEMAKER  -LN    Subjective Pain    Able to rate subjective pain? yes  -LN    Pre-Treatment Pain Level 6  -LN    Subjective Pain Comment No c/o headache- he reports that he felt like a headache was coming on a couple times but never developed into one.   -LN    Neck    Flexion AROM Deficit 75%  -LN    Extension AROM Deficit 75%  -LN    Lt Lat Flexion AROM Deficit WFL  -LN    Rt Lateral Flexion AROM Deficit WFL  -LN    Lt Rotation AROM Deficit WFL  -LN    Rt Rotation AROM Deficit 75%   pain  -LN      User Key  (r) = Recorded By, (t) = Taken By, (c) = Cosigned By    Initials Name Provider Type    LN Daphne Cates, PT Physical Therapist                            PT Assessment/Plan       17 1100       PT Assessment    Assessment Comments Patient continues with decreased muscle tightness noted right UT/MT area, but  to touch. Still " "with limited cervical rotation to right and painful, otherwise CROM improved. Soreness persists in left hip.   -LN     PT Plan    PT Frequency 1x/week;2x/week  -LN     PT Plan Comments Continue per P.O.C.   -LN       User Key  (r) = Recorded By, (t) = Taken By, (c) = Cosigned By    Initials Name Provider Type    ANTONIO Cates, PT Physical Therapist                Modalities       12/19/17 1100          Moist Heat    MH Applied Yes  -LN      Location Bilateral UT/shoulders and back with patient supine in hooklying.   with HOB elevated  -LN      Rx Minutes 15 mins  -LN      MH Prior to Rx Yes  -LN      Ice    Ice Applied Yes  -LN      Location right UT/shoulder/midscapula area with patient seated in chair.  -LN      Rx Minutes 10 mins  -LN      Ice S/P Rx Yes  -LN      Patient reports will apply ice at home to involved area Yes   if it seems to help him  -LN        User Key  (r) = Recorded By, (t) = Taken By, (c) = Cosigned By    Initials Name Provider Type    ANTONIO Cates, PT Physical Therapist                Exercises       12/19/17 1100          Subjective Comments    Subjective Comments \"I'm just sore.\"  \"My left hip is sore.\"  \"I guess I'm about the same.\"   -LN      Subjective Pain    Able to rate subjective pain? yes  -LN      Pre-Treatment Pain Level 6  -LN      Subjective Pain Comment No c/o headache- he reports that he felt like a headache was coming on a couple times but never developed into one.   -LN        User Key  (r) = Recorded By, (t) = Taken By, (c) = Cosigned By    Initials Name Provider Type    ANTONIO Cates, PT Physical Therapist                        Manual Rx (last 36 hours)      Manual Treatments       12/19/17 1100          Manual Rx 1    Manual Rx 1 Location Bilateral UT/levator/cervical PS/OA area with patient seated in chair  -LN      Manual Rx 1 Type STM/trigger point massage with patient seated in chair  -LN      Manual Rx 1 Duration 10 minutes  -LN  "     Manual Rx 2    Manual Rx 2 Location gentle manual Cervical traction in sitting  -LN      Manual Rx 2 Duration 6 x 10 seconds  -LN      Manual Rx 4    Manual Rx 4 Location AA cervical SB   seated; with gentle passive UT stretch  -LN      Manual Rx 4 Grade gentle  -LN      Manual Rx 4 Duration 3 x each  -LN      Manual Rx 5    Manual Rx 5 Location gentle A-P mobs C7- T8 with patient seated.   at spinous processes and transverse processess  -LN      Manual Rx 5 Duration 2 minutes  -LN      Manual Rx 7    Manual Rx 7 Location Passive cervical side glides as tolerated  -LN      Manual Rx 7 Grade gentle  -LN      Manual Rx 7 Duration 2 minutes   -LN        User Key  (r) = Recorded By, (t) = Taken By, (c) = Cosigned By    Initials Name Provider Type    LN Daphne Cates, PT Physical Therapist              Therapy Education  Education Details: Spoke to patient again about looking into purchasing a MHP for home.   Given: HEP, Symptoms/condition management, Pain management  Program: Reinforced  How Provided: Verbal  Provided to: Patient  Level of Understanding: Teach back education performed, Verbalized, Demonstrated              Time Calculation:   Start Time: 1100  Stop Time: 1200  Time Calculation (min): 60 min    Therapy Charges for Today     Code Description Service Date Service Provider Modifiers Qty    95761291698 HC PT HOT OR COLD PACK TREAT MCARE 12/19/2017 Daphne Cates, PT GP 2    35212213973 HC PT MANUAL THERAPY EA 15 MIN 12/19/2017 Daphne Cates, PT GP 2                    Daphne Cates, PT  12/19/2017

## 2017-12-21 ENCOUNTER — HOSPITAL ENCOUNTER (OUTPATIENT)
Dept: PHYSICAL THERAPY | Facility: HOSPITAL | Age: 72
Setting detail: THERAPIES SERIES
Discharge: HOME | End: 2017-12-21

## 2017-12-21 DIAGNOSIS — R26.9 ALTERED GAIT: ICD-10-CM

## 2017-12-21 DIAGNOSIS — M54.2 NECK PAIN: Primary | ICD-10-CM

## 2017-12-21 DIAGNOSIS — M25.50 ARTHRALGIA, UNSPECIFIED JOINT: ICD-10-CM

## 2017-12-21 DIAGNOSIS — M15.9 OSTEOARTHROSIS, GENERALIZED, INVOLVING MULTIPLE SITES: ICD-10-CM

## 2017-12-21 PROCEDURE — 97140 MANUAL THERAPY 1/> REGIONS: CPT

## 2017-12-21 NOTE — THERAPY TREATMENT NOTE
"    Outpatient Physical Therapy Ortho Treatment Note  ASHER Wiley     Patient Name: Roque Lepe  : 1945  MRN: [id number]  Today's Date: 2017      Visit Date: 2017    Visit Dx:    ICD-10-CM ICD-9-CM   1. Neck pain M54.2 723.1   2. Arthralgia, unspecified joint M25.50 719.40   3. Osteoarthrosis, generalized, involving multiple sites M15.9 715.09   4. Altered gait R26.9 781.2       There is no problem list on file for this patient.       Past Medical History:   Diagnosis Date   • Arthritis    • Coronary artery disease    • Diabetes mellitus    • HA (headache)    • Hypertension         Past Surgical History:   Procedure Laterality Date   • JOINT REPLACEMENT Bilateral     JENNIFER   • PACEMAKER IMPLANTATION               PT Ortho       17 1100    Subjective Comments    Subjective Comments \"I may be a little better.\"  \"I felt better when I left here the other day for about 3 hours.\"  \"I'm still working on getting an appointment with an neck specialist.\"  -LN    Precautions and Contraindications    Precautions/Limitations pacemaker  -LN    Precautions NO ES or US SECONDARY TO PACEMAKER  -LN    Contraindications NO ES SECONDARY TO PACEMAKER  -LN    Subjective Pain    Able to rate subjective pain? yes  -LN    Pre-Treatment Pain Level 5  -LN    Subjective Pain Comment No c/o HA.  -LN      17 1100    Subjective Comments    Subjective Comments \"I'm just sore.\"  \"My left hip is sore.\"  \"I guess I'm about the same.\"   -LN    Precautions and Contraindications    Precautions/Limitations seizure precautions  -LN    Precautions NO ES or US SECONDARY TO PACEMAKER  -LN    Contraindications NO ES SECONDARY TO PACEMAKER  -LN    Subjective Pain    Able to rate subjective pain? yes  -LN    Pre-Treatment Pain Level 6  -LN    Subjective Pain Comment No c/o headache- he reports that he felt like a headache was coming on a couple times but never developed into one.   -LN    Neck    Flexion AROM Deficit 75%  -LN    " "Extension AROM Deficit 75%  -LN    Lt Lat Flexion AROM Deficit WFL  -LN    Rt Lateral Flexion AROM Deficit WFL  -LN    Lt Rotation AROM Deficit WFL  -LN    Rt Rotation AROM Deficit 75%   pain  -LN      User Key  (r) = Recorded By, (t) = Taken By, (c) = Cosigned By    Initials Name Provider Type    ANTONIO Cates, PT Physical Therapist                            PT Assessment/Plan       12/21/17 1200       PT Assessment    Assessment Comments Patient reporting increased relief after PT but soreness persists especially in right UT area and minimal muscle tightness noted with tender trigger points.  He is tolerating treatment well but does become guarded with STM ands needs multiple cues to relax shoulders during session.   -LN     PT Plan    PT Frequency 1x/week;2x/week  -LN     PT Plan Comments Continue per P.O.C. Progress with exercises as tolerated.   -LN       User Key  (r) = Recorded By, (t) = Taken By, (c) = Cosigned By    Initials Name Provider Type    ANTONIO Cates, PT Physical Therapist                Modalities       12/21/17 1100          Moist Heat    MH Applied Yes  -LN      Location Bilateral UT/shoulders and back with patient supine in hooklying.   with HOB elevated  -LN      Rx Minutes 15 mins  -LN      MH Prior to Rx Yes  -LN      Ice    Ice Applied Yes  -LN      Location bilateral UT/shoulder/midscapula area with patient seated in chair.  -LN      Rx Minutes 10 mins  -LN      Ice S/P Rx Yes  -LN      Patient reports will apply ice at home to involved area Yes   if it seems to help him  -LN        User Key  (r) = Recorded By, (t) = Taken By, (c) = Cosigned By    Initials Name Provider Type    ANTONIO Cates, PT Physical Therapist                Exercises       12/21/17 1100          Subjective Comments    Subjective Comments \"I may be a little better.\"  \"I felt better when I left here the other day for about 3 hours.\"   -LN      Subjective Pain    Able to rate " subjective pain? yes  -LN      Pre-Treatment Pain Level 5  -LN      Subjective Pain Comment No c/o HA.  -LN        User Key  (r) = Recorded By, (t) = Taken By, (c) = Cosigned By    Initials Name Provider Type    ANTONIO Cates, PT Physical Therapist                        Manual Rx (last 36 hours)      Manual Treatments       12/21/17 1100          Manual Rx 1    Manual Rx 1 Location Bilateral UT/levator/cervical PS/OA area with patient seated in chair  -LN      Manual Rx 1 Type STM/trigger point massage with patient seated in chair  -LN      Manual Rx 1 Duration 10 minutes  -LN      Manual Rx 2    Manual Rx 2 Location gentle manual Cervical traction in sitting  -LN      Manual Rx 2 Duration 6 x 10 seconds  -LN      Manual Rx 4    Manual Rx 4 Location AA cervical SB   seated; with gentle passive UT stretch  -LN      Manual Rx 4 Grade gentle  -LN      Manual Rx 4 Duration 3 x each  -LN      Manual Rx 5    Manual Rx 5 Location gentle A-P mobs C7- T8 with patient seated.   at spinous processes and transverse processess  -LN      Manual Rx 5 Duration 2 minutes  -LN      Manual Rx 7    Manual Rx 7 Location Passive cervical side glides as tolerated  -LN      Manual Rx 7 Grade gentle  -LN      Manual Rx 7 Duration 2 minutes   -LN        User Key  (r) = Recorded By, (t) = Taken By, (c) = Cosigned By    Initials Name Provider Type    ANTONIO Cates PT Physical Therapist              Therapy Education  Education Details: Postural education provided.  Given: HEP, Symptoms/condition management, Pain management  Program: Reinforced  How Provided: Verbal  Provided to: Patient  Level of Understanding: Teach back education performed, Verbalized, Demonstrated              Time Calculation:   Start Time: 1100  Stop Time: 1200  Time Calculation (min): 60 min    Therapy Charges for Today     Code Description Service Date Service Provider Modifiers Qty    34470346769  PT HOT OR COLD PACK TREAT MCARE 12/21/2017  Daphne Cates, PT GP 2    [id number]  PT MANUAL THERAPY EA 15 MIN 12/21/2017 Daphne Cates, PT GP 2                    Daphne Cates, PT  12/21/2017

## 2017-12-26 ENCOUNTER — HOSPITAL ENCOUNTER (OUTPATIENT)
Dept: PHYSICAL THERAPY | Facility: HOSPITAL | Age: 72
Setting detail: THERAPIES SERIES
Discharge: HOME | End: 2017-12-26

## 2017-12-26 DIAGNOSIS — M15.9 OSTEOARTHROSIS, GENERALIZED, INVOLVING MULTIPLE SITES: ICD-10-CM

## 2017-12-26 DIAGNOSIS — M25.50 ARTHRALGIA, UNSPECIFIED JOINT: ICD-10-CM

## 2017-12-26 DIAGNOSIS — M54.2 NECK PAIN: Primary | ICD-10-CM

## 2017-12-26 DIAGNOSIS — R26.9 ALTERED GAIT: ICD-10-CM

## 2017-12-26 PROCEDURE — 97140 MANUAL THERAPY 1/> REGIONS: CPT

## 2017-12-26 NOTE — THERAPY TREATMENT NOTE
"    Outpatient Physical Therapy Ortho Treatment Note  [facility] [place]     Patient Name: Roque Lepe  : 1945  MRN: [id number]  Today's Date: 2017      Visit Date: 2017    Visit Dx:    ICD-10-CM ICD-9-CM   1. Neck pain M54.2 723.1   2. Arthralgia, unspecified joint M25.50 719.40   3. Osteoarthrosis, generalized, involving multiple sites M15.9 715.09   4. Altered gait R26.9 781.2       There is no problem list on file for this patient.       Past Medical History:   Diagnosis Date   • Arthritis    • Coronary artery disease    • Diabetes mellitus    • HA (headache)    • Hypertension         Past Surgical History:   Procedure Laterality Date   • JOINT REPLACEMENT Bilateral     JENNIFER   • PACEMAKER IMPLANTATION               PT Ortho       17 1200    Subjective Comments    Subjective Comments \"I'm still sore on both sides of my neck and my right shoulder.\" He does report that he feels better after therapy for a couple hours and then the pain returns but it is gradual.\"   He also c/o soreness in \"my hip muscle.\" - left hip.   -LN    Precautions and Contraindications    Precautions/Limitations pacemaker  -LN    Precautions NO ES or US SECONDARY TO PACEMAKER  -LN    Contraindications NO ES SECONDARY TO PACEMAKER  -LN    Subjective Pain    Able to rate subjective pain? yes  -LN    Pre-Treatment Pain Level 5  -LN    Subjective Pain Comment No c/o HA.   -LN    Neck    Flexion AROM Deficit WFL  -LN    Extension AROM Deficit 75%  -LN    Lt Lat Flexion AROM Deficit WFL  -LN    Rt Lateral Flexion AROM Deficit WFL    discomfort on right side of neck  -LN    Lt Rotation AROM Deficit WFL  -LN    Rt Rotation AROM Deficit 75%   discomfort on right side of neck  -LN      User Key  (r) = Recorded By, (t) = Taken By, (c) = Cosigned By    Initials Name Provider Type    LN Daphne Cates, PT Physical Therapist                            PT Assessment/Plan       17 1300       PT Assessment    Assessment " "Comments Patient continues with soreness in bilateral cervical PS and right UT area with tenderness but overall decreased muscle guarding noted and improved CROM overall but still limited in right rotation. He continues to show benefit from therapy.   -LN     PT Plan    PT Frequency 1x/week;2x/week  -LN     PT Plan Comments Continue per P.O.C.  Assess benefit of doing MH at end of treatment vs. in beginning.   -LN       User Key  (r) = Recorded By, (t) = Taken By, (c) = Cosigned By    Initials Name Provider Type    ANTONIO Cates, PT Physical Therapist                Modalities       12/26/17 1300          Moist Heat    MH Applied Yes  -LN      Location Bilateral UT/shoulders and back with patient supine in hooklying.   with HOB elevated; and to left hip area  -LN      Rx Minutes Other:   20 minutes  -LN      MH S/P Rx Yes  -LN        User Key  (r) = Recorded By, (t) = Taken By, (c) = Cosigned By    Initials Name Provider Type    ANTONIO Cates, PT Physical Therapist                Exercises       12/26/17 1200          Subjective Comments    Subjective Comments \"I'm still sore on both sides of my neck and my right shoulder.\" He does report that he feels better after therapy for a couple hours and then the pain returns but it is gradual.\"   He also c/o soreness in \"my hip muscle.\" - left hip.   -LN      Subjective Pain    Able to rate subjective pain? yes  -LN      Pre-Treatment Pain Level 5  -LN      Subjective Pain Comment No c/o HA.   -LN        User Key  (r) = Recorded By, (t) = Taken By, (c) = Cosigned By    Initials Name Provider Type    ANTONIO Cates, PT Physical Therapist                        Manual Rx (last 36 hours)      Manual Treatments       12/26/17 1300          Manual Rx 1    Manual Rx 1 Location Bilateral UT/levator/cervical PS/OA area with patient seated in chair  -LN      Manual Rx 1 Type STM/trigger point massage with patient seated in chair  -LN      Manual Rx " 1 Duration 10 minutes  -LN      Manual Rx 2    Manual Rx 2 Location gentle manual Cervical traction in sitting  -LN      Manual Rx 2 Duration 6 x 10 seconds  -LN      Manual Rx 4    Manual Rx 4 Location AA cervical SB   seated; with gentle passive UT stretch  -LN      Manual Rx 4 Grade gentle  -LN      Manual Rx 4 Duration 3 x each  -LN      Manual Rx 5    Manual Rx 5 Location gentle A-P mobs C7- T8 with patient seated.   at spinous processes and transverse processess  -LN      Manual Rx 5 Duration 2 minutes  -LN      Manual Rx 7    Manual Rx 7 Location Passive cervical side glides as tolerated  -LN      Manual Rx 7 Grade gentle  -LN      Manual Rx 7 Duration 2 minutes   -LN        User Key  (r) = Recorded By, (t) = Taken By, (c) = Cosigned By    Initials Name Provider Type    LN Daphne Cates, PT Physical Therapist              Therapy Education  Education Details: Patient to continue with HEP as tolerated.  Given: HEP, Symptoms/condition management, Pain management  Program: Reinforced  How Provided: Verbal  Provided to: Patient  Level of Understanding: Teach back education performed, Verbalized, Demonstrated              Time Calculation:   Start Time: 1300  Stop Time: 1400  Time Calculation (min): 60 min    Therapy Charges for Today     Code Description Service Date Service Provider Modifiers Qty    93082740408 HC PT HOT OR COLD PACK TREAT MCARE 12/26/2017 Daphne Cates, PT GP 1    11879831562 HC PT MANUAL THERAPY EA 15 MIN 12/26/2017 Daphne Cates, PT GP 2                    Daphne Cates, PT  12/26/2017

## 2017-12-28 ENCOUNTER — HOSPITAL ENCOUNTER (OUTPATIENT)
Dept: PHYSICAL THERAPY | Facility: HOSPITAL | Age: 72
Setting detail: THERAPIES SERIES
Discharge: HOME | End: 2017-12-28

## 2017-12-28 DIAGNOSIS — M25.50 ARTHRALGIA, UNSPECIFIED JOINT: ICD-10-CM

## 2017-12-28 DIAGNOSIS — R26.9 ALTERED GAIT: ICD-10-CM

## 2017-12-28 DIAGNOSIS — M54.2 NECK PAIN: Primary | ICD-10-CM

## 2017-12-28 DIAGNOSIS — M15.9 OSTEOARTHROSIS, GENERALIZED, INVOLVING MULTIPLE SITES: ICD-10-CM

## 2017-12-28 PROCEDURE — 97140 MANUAL THERAPY 1/> REGIONS: CPT

## 2017-12-28 NOTE — THERAPY TREATMENT NOTE
"    Outpatient Physical Therapy Ortho Treatment Note  [facility]     Patient Name: Roque Lepe  : 1945  MRN: [id number]  Today's Date: 2017      Visit Date: 2017    Visit Dx:    ICD-10-CM ICD-9-CM   1. Neck pain M54.2 723.1   2. Arthralgia, unspecified joint M25.50 719.40   3. Osteoarthrosis, generalized, involving multiple sites M15.9 715.09   4. Altered gait R26.9 781.2       There is no problem list on file for this patient.       Past Medical History:   Diagnosis Date   • Arthritis    • Coronary artery disease    • Diabetes mellitus    • HA (headache)    • Hypertension         Past Surgical History:   Procedure Laterality Date   • JOINT REPLACEMENT Bilateral     JENNIFER   • PACEMAKER IMPLANTATION               PT Ortho       17 1200    Subjective Comments    Subjective Comments \"I think I'm a hair better.\" He does report that he thinks it did better doing the heat last.\"  Patient continues to c/o left hip pain/soreness; \"It's mostly when I'm walking.\"   -LN    Precautions and Contraindications    Precautions/Limitations pacemaker  -LN    Precautions NO ES or US SECONDARY TO PACEMAKER  -LN    Contraindications NO ES SECONDARY TO PACEMAKER  -LN    Subjective Pain    Able to rate subjective pain? yes  -LN    Pre-Treatment Pain Level 4  -LN    Subjective Pain Comment No c/o HA.   -LN      17 1200    Subjective Comments    Subjective Comments \"I'm still sore on both sides of my neck and my right shoulder.\" He does report that he feels better after therapy for a couple hours and then the pain returns but it is gradual.\"   He also c/o soreness in \"my hip muscle.\" - left hip.   -LN    Precautions and Contraindications    Precautions/Limitations pacemaker  -LN    Precautions NO ES or US SECONDARY TO PACEMAKER  -LN    Contraindications NO ES SECONDARY TO PACEMAKER  -LN    Subjective Pain    Able to rate subjective pain? yes  -LN    Pre-Treatment Pain Level 5  -LN    Subjective Pain Comment No " "c/o HA.   -LN    Neck    Flexion AROM Deficit WFL  -LN    Extension AROM Deficit 75%  -LN    Lt Lat Flexion AROM Deficit WFL  -LN    Rt Lateral Flexion AROM Deficit WFL    discomfort on right side of neck  -LN    Lt Rotation AROM Deficit WFL  -LN    Rt Rotation AROM Deficit 75%   discomfort on right side of neck  -LN      User Key  (r) = Recorded By, (t) = Taken By, (c) = Cosigned By    Initials Name Provider Type    ANTONIO Cates, PT Physical Therapist                            PT Assessment/Plan       12/28/17 1300       PT Assessment    Assessment Comments Patient with decreased pain overall and had more relief with having MH at end of session vs. in beginning.  Overall decreased muscle tightness but minimal still present in right UT/MT area and tighness noted in bilateral SCM, L>R.  Patient still with left hip soreness at greater trochanter area- tolerated gentle stretching fairly well.   -LN     PT Plan    PT Frequency 1x/week;2x/week  -LN     PT Plan Comments Continue per P.O.C.   -LN       User Key  (r) = Recorded By, (t) = Taken By, (c) = Cosigned By    Initials Name Provider Type    ANTONIO Cates, PT Physical Therapist                Modalities       12/28/17 1300          Moist Heat    MH Applied Yes  -LN      Location Bilateral UT/shoulders and back with patient supine in hooklying.   with HOB elevated; and to left hip area  -LN      Rx Minutes Other:   20 minutes  -LN      MH S/P Rx Yes  -LN        User Key  (r) = Recorded By, (t) = Taken By, (c) = Cosigned By    Initials Name Provider Type    ANTONIO Cates, PT Physical Therapist                Exercises       12/28/17 1200          Subjective Comments    Subjective Comments \"I think I'm a hair better.\" He does report that he thinks it did better doing the heat last.\"  Patient continues to c/o left hip pain/soreness; \"It's mostly when I'm walking.\"   -LN      Subjective Pain    Able to rate subjective pain? yes  -LN   "    Pre-Treatment Pain Level 4  -LN      Subjective Pain Comment No c/o HA.   -LN      Exercise 1    Exercise Name 1 Left hip hooklying IR/ER   supine  -LN      Reps 1 5  -LN      Time (Seconds) 1 2 seconds  -LN        User Key  (r) = Recorded By, (t) = Taken By, (c) = Cosigned By    Initials Name Provider Type    ANTONIO Cates, PT Physical Therapist                        Manual Rx (last 36 hours)      Manual Treatments       12/28/17 1300          Manual Rx 1    Manual Rx 1 Location Bilateral UT/levator/cervical PS/OA area with patient seated in chair  -LN      Manual Rx 1 Type STM/trigger point massage with patient seated in chair  -LN      Manual Rx 1 Duration 10 minutes  -LN      Manual Rx 2    Manual Rx 2 Location gentle manual Cervical traction in sitting  -LN      Manual Rx 2 Duration 6 x 10 seconds  -LN      Manual Rx 3    Manual Rx 3 Location gentle passive SCM stretch  -LN      Manual Rx 3 Duration 2 x 10 seconds each  -LN      Manual Rx 4    Manual Rx 4 Location AA cervical SB   seated; with gentle passive UT stretch  -LN      Manual Rx 4 Grade gentle  -LN      Manual Rx 4 Duration 3 x each  -LN      Manual Rx 5    Manual Rx 5 Location gentle A-P mobs C7- T8 with patient seated.   at spinous processes and transverse processess  -LN      Manual Rx 5 Duration 2 minutes  -LN      Manual Rx 7    Manual Rx 7 Location Passive cervical side glides as tolerated  -LN      Manual Rx 7 Grade gentle  -LN      Manual Rx 7 Duration 2 minutes   -LN        User Key  (r) = Recorded By, (t) = Taken By, (c) = Cosigned By    Initials Name Provider Type    ANTONIO Cates, PT Physical Therapist              Therapy Education  Given: HEP, Symptoms/condition management, Pain management  Program: New (instructed patient in gentle left hip IR/ER with patient supine in hooklying. )  How Provided: Verbal  Provided to: Patient  Level of Understanding: Teach back education performed, Verbalized, Demonstrated               Time Calculation:   Start Time: 1300  Stop Time: 1355  Time Calculation (min): 55 min    Therapy Charges for Today     Code Description Service Date Service Provider Modifiers Qty    87260147927 HC PT HOT OR COLD PACK TREAT [facility]ARE 12/28/2017 Daphne Cates, PT GP 1    77994840206 HC PT MANUAL THERAPY EA 15 MIN 12/28/2017 Daphne Cates, PT GP 2                    Daphne Cates, PT  12/28/2017

## 2018-01-02 ENCOUNTER — HOSPITAL ENCOUNTER (OUTPATIENT)
Dept: PHYSICAL THERAPY | Facility: HOSPITAL | Age: 73
Setting detail: THERAPIES SERIES
Discharge: HOME | End: 2018-01-02

## 2018-01-02 DIAGNOSIS — R26.9 ALTERED GAIT: ICD-10-CM

## 2018-01-02 DIAGNOSIS — M25.50 ARTHRALGIA, UNSPECIFIED JOINT: ICD-10-CM

## 2018-01-02 DIAGNOSIS — M54.2 NECK PAIN: Primary | ICD-10-CM

## 2018-01-02 DIAGNOSIS — M15.9 OSTEOARTHROSIS, GENERALIZED, INVOLVING MULTIPLE SITES: ICD-10-CM

## 2018-01-02 PROCEDURE — 97140 MANUAL THERAPY 1/> REGIONS: CPT

## 2018-01-02 NOTE — THERAPY TREATMENT NOTE
"    Outpatient Physical Therapy Ortho Treatment Note  [facility]     Patient Name: Roque Lepe  : 1945  MRN: [id number]  Today's Date: 2018      Visit Date: 2018    Visit Dx:    ICD-10-CM ICD-9-CM   1. Neck pain M54.2 723.1   2. Arthralgia, unspecified joint M25.50 719.40   3. Osteoarthrosis, generalized, involving multiple sites M15.9 715.09   4. Altered gait R26.9 781.2       There is no problem list on file for this patient.       Past Medical History:   Diagnosis Date   • Arthritis    • Coronary artery disease    • Diabetes mellitus    • HA (headache)    • Hypertension         Past Surgical History:   Procedure Laterality Date   • JOINT REPLACEMENT Bilateral     JENNIFER   • PACEMAKER IMPLANTATION               PT Ortho       18 1100    Subjective Comments    Subjective Comments Patient reports that his head felt really heavy yesterday. Patient c/o pain right side of neck and into right shoulder area.  \"I don't think it is as tight though.\"  He reports \"my left foot sometimes feels tingly.\"   -LN    Precautions and Contraindications    Precautions/Limitations pacemaker  -LN    Precautions NO ES or US SECONDARY TO PACEMAKER  -LN    Contraindications NO ES SECONDARY TO PACEMAKER  -LN    Subjective Pain    Able to rate subjective pain? yes  -LN    Pre-Treatment Pain Level 6  -LN    Post-Treatment Pain Level 5  -LN    Subjective Pain Comment No c/o HA.   -LN      User Key  (r) = Recorded By, (t) = Taken By, (c) = Cosigned By    Initials Name Provider Type    LN Daphne Cates, PT Physical Therapist                            PT Assessment/Plan       18 1200       PT Assessment    Assessment Comments Patient continues to c/ discomfort right neck and UT area, but muscle tightness decreased to minimal now and overall decreased tenderness. He was noted to be very tender with A-P mobs at T4.  Decreased SCM tightness noted today as well. He is tolerating passive stretching better, but " "still with c/o discomfort on right.   -LN     PT Plan    PT Frequency 1x/week;2x/week  -LN     PT Plan Comments Continue per P.O.C.   -LN       User Key  (r) = Recorded By, (t) = Taken By, (c) = Cosigned By    Initials Name Provider Type    ANTONIO Cates, PT Physical Therapist                Modalities       01/02/18 1100          Moist Heat    MH Applied Yes  -LN      Location Bilateral UT/shoulders and back with patient supine in hooklying.   with HOB elevated; and to left hip area  -LN      Rx Minutes Other:   20 minutes  -LN      MH S/P Rx Yes  -LN        User Key  (r) = Recorded By, (t) = Taken By, (c) = Cosigned By    Initials Name Provider Type    ANTONIO Cates, PT Physical Therapist                Exercises       01/02/18 1100          Subjective Comments    Subjective Comments Patient reports that his head felt really heavy yesterday. Patient c/o pain right side of neck and into right shoulder area.  \"I don't think it is as tight though.\"  He reports \"my left foot sometimes feels tingly.\"   -LN      Subjective Pain    Able to rate subjective pain? yes  -LN      Pre-Treatment Pain Level 6  -LN      Post-Treatment Pain Level 5  -LN      Subjective Pain Comment No c/o HA.   -LN      Exercise 5    Exercise Name 5 scapula adduction  -LN      Reps 5 5  -LN      Time (Seconds) 5 5 seconds  -LN        User Key  (r) = Recorded By, (t) = Taken By, (c) = Cosigned By    Initials Name Provider Type    ANTONIO Cates, PT Physical Therapist                        Manual Rx (last 36 hours)      Manual Treatments       01/02/18 1100          Manual Rx 1    Manual Rx 1 Location Bilateral UT/levator/cervical PS/OA area with patient seated in chair  -LN      Manual Rx 1 Type STM/trigger point massage with patient seated in chair  -LN      Manual Rx 1 Duration 10 minutes  -LN      Manual Rx 2    Manual Rx 2 Location gentle manual Cervical traction in sitting  -LN      Manual Rx 2 Duration 8 x " 10 seconds  -LN      Manual Rx 3    Manual Rx 3 Location gentle passive SCM stretch  -LN      Manual Rx 3 Duration 2 x 10 seconds each  -LN      Manual Rx 4    Manual Rx 4 Location AA cervical SB   seated; with gentle passive UT stretch  -LN      Manual Rx 4 Grade gentle  -LN      Manual Rx 4 Duration 3 x each  -LN      Manual Rx 5    Manual Rx 5 Location gentle A-P mobs C7- T8 with patient seated.   at spinous processes and transverse processess  -LN      Manual Rx 5 Duration 2 minutes  -LN      Manual Rx 7    Manual Rx 7 Location Passive cervical side glides as tolerated  -LN      Manual Rx 7 Grade gentle  -LN      Manual Rx 7 Duration 2 minutes   -LN        User Key  (r) = Recorded By, (t) = Taken By, (c) = Cosigned By    Initials Name Provider Type    LN Daphne Cates, PT Physical Therapist              Therapy Education  Given: HEP, Symptoms/condition management, Pain management  Program: Reinforced  How Provided: Verbal  Provided to: Patient  Level of Understanding: Teach back education performed, Verbalized, Demonstrated              Time Calculation:   Start Time: 1135  Stop Time: 1230  Time Calculation (min): 55 min    Therapy Charges for Today     Code Description Service Date Service Provider Modifiers Qty    66198371693 HC PT HOT OR COLD PACK TREAT [facility]ARE 1/2/2018 Daphne Cates, PT GP 1    91026372493 HC PT MANUAL THERAPY EA 15 MIN 1/2/2018 Daphne Cates, PT GP 2                    Daphne Cates, PT  1/2/2018

## 2018-01-04 ENCOUNTER — HOSPITAL ENCOUNTER (OUTPATIENT)
Dept: PHYSICAL THERAPY | Facility: HOSPITAL | Age: 73
Setting detail: THERAPIES SERIES
Discharge: HOME | End: 2018-01-04

## 2018-01-04 DIAGNOSIS — M54.2 NECK PAIN: Primary | ICD-10-CM

## 2018-01-04 DIAGNOSIS — M15.9 OSTEOARTHROSIS, GENERALIZED, INVOLVING MULTIPLE SITES: ICD-10-CM

## 2018-01-04 DIAGNOSIS — R26.9 ALTERED GAIT: ICD-10-CM

## 2018-01-04 DIAGNOSIS — M25.50 ARTHRALGIA, UNSPECIFIED JOINT: ICD-10-CM

## 2018-01-04 PROCEDURE — 97140 MANUAL THERAPY 1/> REGIONS: CPT

## 2018-01-04 NOTE — THERAPY TREATMENT NOTE
"    Outpatient Physical Therapy Ortho Treatment Note  [facility]     Patient Name: Roque Lepe  : 1945  MRN: [id number]  Today's Date: 2018      Visit Date: 2018    Visit Dx:    ICD-10-CM ICD-9-CM   1. Neck pain M54.2 723.1   2. Arthralgia, unspecified joint M25.50 719.40   3. Osteoarthrosis, generalized, involving multiple sites M15.9 715.09   4. Altered gait R26.9 781.2       There is no problem list on file for this patient.       Past Medical History:   Diagnosis Date   • Arthritis    • Coronary artery disease    • Diabetes mellitus    • HA (headache)    • Hypertension         Past Surgical History:   Procedure Laterality Date   • JOINT REPLACEMENT Bilateral     JENNIFER   • PACEMAKER IMPLANTATION               PT Ortho       18 1100    Subjective Comments    Subjective Comments Patient reports soreness in neck and shoulders, R>L.  Patient continues with soreness in left hip as well. He does report that therapy is helping and he feels better after PT.   -LN    Precautions and Contraindications    Precautions/Limitations pacemaker  -LN    Precautions NO ES or US SECONDARY TO PACEMAKER  -LN    Contraindications NO ES SECONDARY TO PACEMAKER  -LN    Subjective Pain    Able to rate subjective pain? yes  -LN    Pre-Treatment Pain Level 5  -LN    Post-Treatment Pain Level 4  -LN    Subjective Pain Comment No c/o HA.   -LN    Neck    Flexion AROM Deficit WFL  -LN    Extension AROM Deficit WFL  -LN    Lt Lat Flexion AROM Deficit WFL  -LN    Rt Lateral Flexion AROM Deficit WFL  -LN    Lt Rotation AROM Deficit WFL  -LN    Rt Rotation AROM Deficit 75%   discomfort right side of neck  -LN      18 1100    Subjective Comments    Subjective Comments Patient reports that his head felt really heavy yesterday. Patient c/o pain right side of neck and into right shoulder area.  \"I don't think it is as tight though.\"  He reports \"my left foot sometimes feels tingly.\"   -LN    Precautions and " Contraindications    Precautions/Limitations pacemaker  -LN    Precautions NO ES or US SECONDARY TO PACEMAKER  -LN    Contraindications NO ES SECONDARY TO PACEMAKER  -LN    Subjective Pain    Able to rate subjective pain? yes  -LN    Pre-Treatment Pain Level 6  -LN    Post-Treatment Pain Level 5  -LN    Subjective Pain Comment No c/o HA.   -LN      User Key  (r) = Recorded By, (t) = Taken By, (c) = Cosigned By    Initials Name Provider Type    ANTONIO Cates, PT Physical Therapist                            PT Assessment/Plan       01/04/18 1200       PT Assessment    Assessment Comments Patient continues with soreness in R>L cervical muscles, UT/MT; overall decreased muscle tightness noted, but minimal persists in right UT/MT/ SCM/cervical PS.  Still very tender.  Improved CROM, but still 25% limited in right rotation secondary to c/o right sided neck pain. He is doing well with HEP.   -LN     PT Plan    PT Frequency 1x/week;2x/week  -LN     PT Plan Comments Continue per P.O.C.  Continue 2 x week next week and then decrease to 1 x week as tolerated.  Consider Kinesiotaping for left hip pain.   -LN       User Key  (r) = Recorded By, (t) = Taken By, (c) = Cosigned By    Initials Name Provider Type    ANTONIO Cates, PT Physical Therapist                Modalities       01/04/18 1100          Moist Heat    MH Applied Yes  -LN      Location Bilateral UT/shoulders and back with patient supine in hooklying.   with HOB elevated; and to left hip area  -LN      Rx Minutes Other:   20 minutes  -LN       S/P Rx Yes  -LN        User Key  (r) = Recorded By, (t) = Taken By, (c) = Cosigned By    Initials Name Provider Type    ANTONIO Cates, PT Physical Therapist                Exercises       01/04/18 1100          Subjective Comments    Subjective Comments Patient reports soreness in neck and shoulders, R>L.  Patient continues with soreness in left hip as well. He does report that therapy is  helping and he feels better after PT.   -LN      Subjective Pain    Able to rate subjective pain? yes  -LN      Pre-Treatment Pain Level 5  -LN      Post-Treatment Pain Level 4  -LN      Subjective Pain Comment No c/o HA.   -LN        User Key  (r) = Recorded By, (t) = Taken By, (c) = Cosigned By    Initials Name Provider Type    ANTONIO Cates, PT Physical Therapist                        Manual Rx (last 36 hours)      Manual Treatments       01/04/18 1100          Manual Rx 1    Manual Rx 1 Location Bilateral UT/levator/cervical PS/OA area with patient seated in chair  -LN      Manual Rx 1 Type STM/trigger point massage with patient seated in chair  -LN      Manual Rx 1 Duration 10 minutes  -LN      Manual Rx 2    Manual Rx 2 Location gentle manual Cervical traction in sitting  -LN      Manual Rx 2 Duration 8 x 10 seconds  -LN      Manual Rx 3    Manual Rx 3 Location gentle passive SCM stretch  -LN      Manual Rx 3 Duration 2 x 10 seconds each  -LN      Manual Rx 4    Manual Rx 4 Location AA cervical SB   seated; with gentle passive UT stretch  -LN      Manual Rx 4 Grade gentle  -LN      Manual Rx 4 Duration 3 x each  -LN      Manual Rx 5    Manual Rx 5 Location gentle A-P mobs C7- T8 with patient seated.   at spinous processes and transverse processess  -LN      Manual Rx 5 Duration 2 minutes  -LN      Manual Rx 7    Manual Rx 7 Location Passive cervical side glides as tolerated  -LN      Manual Rx 7 Grade gentle  -LN      Manual Rx 7 Duration 2 minutes   -LN        User Key  (r) = Recorded By, (t) = Taken By, (c) = Cosigned By    Initials Name Provider Type    ANTONIO Cates, PT Physical Therapist              Therapy Education  Given: HEP, Symptoms/condition management, Pain management  Program: Reinforced  How Provided: Verbal  Provided to: Patient  Level of Understanding: Teach back education performed, Verbalized, Demonstrated              Time Calculation:   Start Time: 1135  Stop  Time: 1235  Time Calculation (min): 60 min    Therapy Charges for Today     Code Description Service Date Service Provider Modifiers Qty    41817451320 HC PT HOT OR COLD PACK TREAT [facility]ARE 1/4/2018 Daphne Cates, PT GP 1    43374107188 HC PT MANUAL THERAPY EA 15 MIN 1/4/2018 Daphne Cates, PT GP 2                    Daphne Cates, PT  1/4/2018

## 2018-01-09 ENCOUNTER — HOSPITAL ENCOUNTER (OUTPATIENT)
Dept: PHYSICAL THERAPY | Facility: HOSPITAL | Age: 73
Setting detail: THERAPIES SERIES
Discharge: HOME | End: 2018-01-09

## 2018-01-09 DIAGNOSIS — M54.2 NECK PAIN: Primary | ICD-10-CM

## 2018-01-09 DIAGNOSIS — R26.9 ALTERED GAIT: ICD-10-CM

## 2018-01-09 DIAGNOSIS — M15.9 OSTEOARTHROSIS, GENERALIZED, INVOLVING MULTIPLE SITES: ICD-10-CM

## 2018-01-09 DIAGNOSIS — M25.50 ARTHRALGIA, UNSPECIFIED JOINT: ICD-10-CM

## 2018-01-09 PROCEDURE — 97140 MANUAL THERAPY 1/> REGIONS: CPT

## 2018-01-09 NOTE — THERAPY TREATMENT NOTE
Outpatient Physical Therapy Ortho Treatment Note  ASHER Wiley     Patient Name: Roque Lepe  : 1945  MRN: [id number]  Today's Date: 2018      Visit Date: 2018    Visit Dx:    ICD-10-CM ICD-9-CM   1. Neck pain M54.2 723.1   2. Arthralgia, unspecified joint M25.50 719.40   3. Osteoarthrosis, generalized, involving multiple sites M15.9 715.09   4. Altered gait R26.9 781.2       There is no problem list on file for this patient.       Past Medical History:   Diagnosis Date   • Arthritis    • Coronary artery disease    • Diabetes mellitus    • HA (headache)    • Hypertension         Past Surgical History:   Procedure Laterality Date   • JOINT REPLACEMENT Bilateral     JENNIFER   • PACEMAKER IMPLANTATION               PT Ortho       18 1100    Subjective Comments    Subjective Comments Patient reports that he is about the same as last visit.  He reports that his left hip seems to be hurting less.   -LN    Precautions and Contraindications    Precautions/Limitations pacemaker  -LN    Precautions NO ES or US SECONDARY TO PACEMAKER  -LN    Contraindications NO ES SECONDARY TO PACEMAKER  -LN    Subjective Pain    Able to rate subjective pain? yes  -LN    Pre-Treatment Pain Level 5  -LN    Post-Treatment Pain Level 4  -LN    Subjective Pain Comment Still no c/o any HA.  -LN      User Key  (r) = Recorded By, (t) = Taken By, (c) = Cosigned By    Initials Name Provider Type    ANTONIO Cates, PT Physical Therapist                            PT Assessment/Plan       18 1200       PT Assessment    Assessment Comments Patient continues with overall decreased muscle tightness and overall decreased tenderness- 2 trigger points noted in right UT & MT. Significant decrease in tenderness with A-P mobs and improved cervical glide noted on right with manual techniques.  He is also beginning to report decreased left hip pain.   -LN     PT Plan    PT Frequency 1x/week;2x/week  -LN     PT Plan Comments  Continue per P.O.C.   -LN       User Key  (r) = Recorded By, (t) = Taken By, (c) = Cosigned By    Initials Name Provider Type    ANTONIO Cates, PT Physical Therapist                Modalities       01/09/18 1100          Moist Heat    MH Applied Yes  -LN      Location Bilateral UT/shoulders and back with patient supine in hooklying.   with HOB elevated; and to left hip area  -LN      Rx Minutes Other:   20 minutes  -LN      MH S/P Rx Yes  -LN        User Key  (r) = Recorded By, (t) = Taken By, (c) = Cosigned By    Initials Name Provider Type    ANTONIO Cates, PT Physical Therapist                Exercises       01/09/18 1100          Subjective Comments    Subjective Comments Patient reports that he is about the same as last visit.  He reports that his left hip seems to be hurting less.   -LN      Subjective Pain    Able to rate subjective pain? yes  -LN      Pre-Treatment Pain Level 5  -LN      Post-Treatment Pain Level 4  -LN      Subjective Pain Comment Still no c/o any HA.  -LN        User Key  (r) = Recorded By, (t) = Taken By, (c) = Cosigned By    Initials Name Provider Type    ANTONIO Cates, PT Physical Therapist                        Manual Rx (last 36 hours)      Manual Treatments       01/09/18 1100          Manual Rx 1    Manual Rx 1 Location Bilateral UT/levator/cervical PS/OA area with patient seated in chair  -LN      Manual Rx 1 Type STM/trigger point massage with patient seated in chair  -LN      Manual Rx 1 Duration 10 minutes  -LN      Manual Rx 2    Manual Rx 2 Location gentle manual Cervical traction in sitting  -LN      Manual Rx 2 Duration 8 x 10 seconds  -LN      Manual Rx 3    Manual Rx 3 Location gentle passive SCM stretch  -LN      Manual Rx 3 Duration 2 x 10 seconds each  -LN      Manual Rx 4    Manual Rx 4 Location AA cervical SB   seated; with gentle passive UT stretch  -LN      Manual Rx 4 Grade gentle  -LN      Manual Rx 4 Duration 3 x each  -LN       Manual Rx 5    Manual Rx 5 Location gentle A-P mobs C7- T8 with patient seated.   at spinous processes and transverse processess  -LN      Manual Rx 5 Duration 2 minutes  -LN      Manual Rx 7    Manual Rx 7 Location Passive cervical side glides as tolerated  -LN      Manual Rx 7 Grade gentle  -LN      Manual Rx 7 Duration 2 minutes   -LN        User Key  (r) = Recorded By, (t) = Taken By, (c) = Cosigned By    Initials Name Provider Type    LN Daphne Caets, PT Physical Therapist              Therapy Education  Given: HEP, Symptoms/condition management, Pain management  Program: Reinforced  How Provided: Verbal  Provided to: Patient  Level of Understanding: Teach back education performed, Verbalized, Demonstrated              Time Calculation:   Start Time: 1100  Stop Time: 1200  Time Calculation (min): 60 min    Therapy Charges for Today     Code Description Service Date Service Provider Modifiers Qty    79083682221 HC PT HOT OR COLD PACK TREAT MCARE 1/9/2018 Daphne Cates, PT GP 1    64783835825 HC PT MANUAL THERAPY EA 15 MIN 1/9/2018 Daphne Cates, PT GP 2                    Daphne Cates, PT  1/9/2018

## 2018-01-11 ENCOUNTER — HOSPITAL ENCOUNTER (OUTPATIENT)
Dept: PHYSICAL THERAPY | Facility: HOSPITAL | Age: 73
Setting detail: THERAPIES SERIES
Discharge: HOME | End: 2018-01-11

## 2018-01-11 DIAGNOSIS — M25.50 ARTHRALGIA, UNSPECIFIED JOINT: ICD-10-CM

## 2018-01-11 DIAGNOSIS — M15.9 OSTEOARTHROSIS, GENERALIZED, INVOLVING MULTIPLE SITES: ICD-10-CM

## 2018-01-11 DIAGNOSIS — R26.9 ALTERED GAIT: ICD-10-CM

## 2018-01-11 DIAGNOSIS — M54.2 NECK PAIN: Primary | ICD-10-CM

## 2018-01-11 PROCEDURE — 97140 MANUAL THERAPY 1/> REGIONS: CPT

## 2018-01-11 NOTE — THERAPY TREATMENT NOTE
"    Outpatient Physical Therapy Ortho Treatment Note  ASHER Wiley     Patient Name: Roque Lepe  : 1945  MRN: [id number]  Today's Date: 2018      Visit Date: 2018    Visit Dx:    ICD-10-CM ICD-9-CM   1. Neck pain M54.2 723.1   2. Arthralgia, unspecified joint M25.50 719.40   3. Altered gait R26.9 781.2   4. Osteoarthrosis, generalized, involving multiple sites M15.9 715.09       There is no problem list on file for this patient.       Past Medical History:   Diagnosis Date   • Arthritis    • Coronary artery disease    • Diabetes mellitus    • HA (headache)    • Hypertension         Past Surgical History:   Procedure Laterality Date   • JOINT REPLACEMENT Bilateral     JENNIFER   • PACEMAKER IMPLANTATION               PT Ortho       18 1000    Subjective Comments    Subjective Comments Patient reports that he is feeling better. \"I still feel it on my right side, but it's not as bad and not as tight.\"  No c/o any HA.   -LN    Precautions and Contraindications    Precautions/Limitations pacemaker  -LN    Precautions NO ES or US SECONDARY TO PACEMAKER  -LN    Contraindications NO ES SECONDARY TO PACEMAKER  -LN    Subjective Pain    Able to rate subjective pain? yes  -LN    Pre-Treatment Pain Level 4  -LN    Post-Treatment Pain Level 3  -LN    Subjective Pain Comment \"I feel better than I did when I came in.\"   -LN    Neck    Flexion AROM Deficit WFL  -LN    Extension AROM Deficit WFL  -LN    Lt Lat Flexion AROM Deficit WFL  -LN    Rt Lateral Flexion AROM Deficit WFL  -LN    Lt Rotation AROM Deficit WFL  -LN    Rt Rotation AROM Deficit 75%   discomfort right side  -LN      18 1100    Subjective Comments    Subjective Comments Patient reports that he is about the same as last visit.  He reports that his left hip seems to be hurting less.   -LN    Precautions and Contraindications    Precautions/Limitations pacemaker  -LN    Precautions NO ES or US SECONDARY TO PACEMAKER  -LN    Contraindications " "NO ES SECONDARY TO PACEMAKER  -LN    Subjective Pain    Able to rate subjective pain? yes  -LN    Pre-Treatment Pain Level 5  -LN    Post-Treatment Pain Level 4  -LN    Subjective Pain Comment Still no c/o any HA.  -LN      User Key  (r) = Recorded By, (t) = Taken By, (c) = Cosigned By    Initials Name Provider Type    ANTONIO Cates, PT Physical Therapist                            PT Assessment/Plan       01/11/18 1200       PT Assessment    Assessment Comments Patient with overall decreased c/o pain and decreased c/o muscle tightness; minimal persists in right UT/MT.  Decreased tenderness noted. Improved CROM except still a little limited in rotation to right.   -LN     PT Plan    PT Frequency 1x/week;2x/week  -LN     PT Plan Comments Continue 2 x week for 1 more week and then plan on decreasing frequency to 1 x week.   -LN       User Key  (r) = Recorded By, (t) = Taken By, (c) = Cosigned By    Initials Name Provider Type    ANTONIO Cates, PT Physical Therapist                Modalities       01/11/18 1000          Moist Heat    MH Applied Yes  -LN      Location Bilateral UT/shoulders and back with patient supine in hooklying.   with HOB elevated; and to left hip area  -LN      Rx Minutes Other:   20 minutes  -LN      MH S/P Rx Yes  -LN        User Key  (r) = Recorded By, (t) = Taken By, (c) = Cosigned By    Initials Name Provider Type    ANTONIO Cates, PT Physical Therapist                Exercises       01/11/18 1000          Subjective Comments    Subjective Comments Patient reports that he is feeling better. \"I still feel it on my right side, but it's not as bad and not as tight.\"  No c/o any HA.   -LN      Subjective Pain    Able to rate subjective pain? yes  -LN      Pre-Treatment Pain Level 4  -LN      Post-Treatment Pain Level 3  -LN      Subjective Pain Comment \"I feel better than I did when I came in.\"   -LN        User Key  (r) = Recorded By, (t) = Taken By, (c) = " Cosigned By    Initials Name Provider Type    LN Daphne Cates, PT Physical Therapist                        Manual Rx (last 36 hours)      Manual Treatments       01/11/18 1100          Manual Rx 1    Manual Rx 1 Location Bilateral UT/levator/cervical PS/OA area with patient seated in chair  -LN      Manual Rx 1 Type STM/trigger point massage with patient seated in chair  -LN      Manual Rx 1 Duration 10 minutes  -LN      Manual Rx 2    Manual Rx 2 Location gentle manual Cervical traction in sitting  -LN      Manual Rx 2 Duration 8 x 10 seconds  -LN      Manual Rx 3    Manual Rx 3 Location gentle passive SCM stretch  -LN      Manual Rx 3 Duration 2 x 10 seconds each  -LN      Manual Rx 4    Manual Rx 4 Location AA cervical SB   seated; with gentle passive UT stretch  -LN      Manual Rx 4 Grade gentle  -LN      Manual Rx 4 Duration 3 x each  -LN      Manual Rx 5    Manual Rx 5 Location gentle A-P mobs C7- T8 with patient seated.   at spinous processes and transverse processess  -LN      Manual Rx 5 Duration 2 minutes  -LN      Manual Rx 7    Manual Rx 7 Location Passive cervical side glides as tolerated  -LN      Manual Rx 7 Grade gentle  -LN      Manual Rx 7 Duration 2 minutes   -LN        User Key  (r) = Recorded By, (t) = Taken By, (c) = Cosigned By    Initials Name Provider Type    LN Daphne Cates, PT Physical Therapist              Therapy Education  Given: HEP, Symptoms/condition management, Pain management  Program: Reinforced  How Provided: Verbal  Provided to: Patient  Level of Understanding: Teach back education performed, Verbalized, Demonstrated              Time Calculation:   Start Time: 1100  Stop Time: 1155  Time Calculation (min): 55 min    Therapy Charges for Today     Code Description Service Date Service Provider Modifiers Qty    82821014019 HC PT HOT OR COLD PACK TREAT MCARE 1/11/2018 Daphne Cates, PT GP 1    15556637344 HC PT MANUAL THERAPY EA 15 MIN 1/11/2018 Daphne  Mavis Cates, PT GP 2                    Daphne Cates, PT  1/11/2018

## 2018-01-16 ENCOUNTER — APPOINTMENT (OUTPATIENT)
Dept: PHYSICAL THERAPY | Facility: HOSPITAL | Age: 73
End: 2018-01-16

## 2018-01-18 ENCOUNTER — HOSPITAL ENCOUNTER (OUTPATIENT)
Dept: PHYSICAL THERAPY | Facility: HOSPITAL | Age: 73
Setting detail: THERAPIES SERIES
Discharge: HOME | End: 2018-01-18

## 2018-01-18 DIAGNOSIS — M25.50 ARTHRALGIA, UNSPECIFIED JOINT: ICD-10-CM

## 2018-01-18 DIAGNOSIS — M54.2 NECK PAIN: Primary | ICD-10-CM

## 2018-01-18 DIAGNOSIS — R26.9 ALTERED GAIT: ICD-10-CM

## 2018-01-18 DIAGNOSIS — M15.9 OSTEOARTHROSIS, GENERALIZED, INVOLVING MULTIPLE SITES: ICD-10-CM

## 2018-01-18 PROCEDURE — 97140 MANUAL THERAPY 1/> REGIONS: CPT

## 2018-01-18 PROCEDURE — G8981 BODY POS CURRENT STATUS: HCPCS

## 2018-01-18 PROCEDURE — G8982 BODY POS GOAL STATUS: HCPCS
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
## 2018-01-25 ENCOUNTER — HOSPITAL ENCOUNTER (OUTPATIENT)
Dept: PHYSICAL THERAPY | Facility: HOSPITAL | Age: 73
Setting detail: THERAPIES SERIES
Discharge: HOME | End: 2018-01-25

## 2018-01-25 DIAGNOSIS — M15.9 OSTEOARTHROSIS, GENERALIZED, INVOLVING MULTIPLE SITES: ICD-10-CM

## 2018-01-25 DIAGNOSIS — M25.50 ARTHRALGIA, UNSPECIFIED JOINT: ICD-10-CM

## 2018-01-25 DIAGNOSIS — M54.2 NECK PAIN: Primary | ICD-10-CM

## 2018-01-25 DIAGNOSIS — R26.9 ALTERED GAIT: ICD-10-CM

## 2018-01-25 PROCEDURE — 97140 MANUAL THERAPY 1/> REGIONS: CPT

## 2018-01-25 NOTE — THERAPY TREATMENT NOTE
"    Outpatient Physical Therapy Ortho Treatment Note  ASHER Wiley     Patient Name: Roque Lepe  : 1945  MRN: [id number]  Today's Date: 2018      Visit Date: 2018    Visit Dx:    ICD-10-CM ICD-9-CM   1. Neck pain M54.2 723.1   2. Arthralgia, unspecified joint M25.50 719.40   3. Altered gait R26.9 781.2   4. Osteoarthrosis, generalized, involving multiple sites M15.9 715.09       There is no problem list on file for this patient.       Past Medical History:   Diagnosis Date   • Arthritis    • Coronary artery disease    • Diabetes mellitus    • HA (headache)    • Hypertension         Past Surgical History:   Procedure Laterality Date   • JOINT REPLACEMENT Bilateral     JENNIFER   • PACEMAKER IMPLANTATION               PT Ortho       18 1100    Subjective Comments    Subjective Comments Patient reports \"I am hurting today on the right side of my neck and my shoulder.\" \"When I wake up in the morning, my neck always feels stiff on the right side.\"  \"I feel better after therapy; it just doesn't last very long.\"   -LN    Precautions and Contraindications    Precautions/Limitations pacemaker  -LN    Precautions NO ES or US SECONDARY TO PACEMAKER  -LN    Contraindications NO ES SECONDARY TO PACEMAKER  -LN    Subjective Pain    Able to rate subjective pain? yes  -LN    Pre-Treatment Pain Level 6  -LN    Post-Treatment Pain Level 5  -LN      User Key  (r) = Recorded By, (t) = Taken By, (c) = Cosigned By    Initials Name Provider Type    LN Daphne Cates, PT Physical Therapist                            PT Assessment/Plan       18 1200       PT Assessment    Assessment Comments Patient with moderate muscle tightness right UT and cervical PS/SCM with tenderness; decreased to minimal after treatment. Overall improved CROM, but still limited and painful right SB and rotation.  No c/o any HA.  Do feel he may benefit from epidural injections/trigger point injections secondary to persistent pain " "and tightness on right side- to see neck specialist on Monday.   -LN     PT Plan    PT Frequency 1x/week  -LN     PT Plan Comments Continue 1 x week; patient sees neck specialist on Monday, 1/29/2018.   -LN       User Key  (r) = Recorded By, (t) = Taken By, (c) = Cosigned By    Initials Name Provider Type    ANTONIO Cates, PT Physical Therapist                Modalities       01/25/18 1100          Moist Heat    MH Applied Yes  -LN      Location Bilateral UT/shoulders and back with patient supine in hooklying.   with HOB elevated; and to left hip area  -LN      Rx Minutes Other:   20 minutes  -LN      MH S/P Rx Yes  -LN        User Key  (r) = Recorded By, (t) = Taken By, (c) = Cosigned By    Initials Name Provider Type    ANTONIO Cates, PT Physical Therapist                Exercises       01/25/18 1100          Subjective Comments    Subjective Comments Patient reports \"I am hurting today on the right side of my neck and my shoulder.\" \"When I wake up in the morning, my neck always feels stiff on the right side.\"  \"I feel better after therapy; it just doesn't last very long.\"   -LN      Subjective Pain    Able to rate subjective pain? yes  -LN      Pre-Treatment Pain Level 6  -LN      Post-Treatment Pain Level 5  -LN        User Key  (r) = Recorded By, (t) = Taken By, (c) = Cosigned By    Initials Name Provider Type    ANTONIO Cates, PT Physical Therapist                        Manual Rx (last 36 hours)      Manual Treatments       01/25/18 1100          Manual Rx 1    Manual Rx 1 Location Bilateral UT/levator/cervical PS/OA area with patient seated in chair  -LN      Manual Rx 1 Type STM/trigger point massage with patient seated in chair  -LN      Manual Rx 1 Duration 10 minutes  -LN      Manual Rx 2    Manual Rx 2 Location gentle manual Cervical traction in sitting  -LN      Manual Rx 2 Duration 8 x 10 seconds  -LN      Manual Rx 3    Manual Rx 3 Location gentle passive SCM " stretch  -LN      Manual Rx 3 Duration 3 x 10 seconds each  -LN      Manual Rx 4    Manual Rx 4 Location AA cervical SB   seated; with gentle passive UT stretch  -LN      Manual Rx 4 Grade gentle  -LN      Manual Rx 4 Duration 3 x each  -LN      Manual Rx 5    Manual Rx 5 Location gentle A-P mobs C7- T8 with patient seated.   at spinous processes and transverse processess  -LN      Manual Rx 5 Duration 2 minutes  -LN      Manual Rx 7    Manual Rx 7 Location Passive cervical side glides as tolerated  -LN      Manual Rx 7 Grade gentle  -LN      Manual Rx 7 Duration 2 minutes   -LN        User Key  (r) = Recorded By, (t) = Taken By, (c) = Cosigned By    Initials Name Provider Type    LN Daphne Cates, PT Physical Therapist              Therapy Education  Education Details: Encouraged patient to continue with HEP.   Given: HEP, Symptoms/condition management, Pain management  Program: Reinforced  How Provided: Verbal  Provided to: Patient  Level of Understanding: Teach back education performed, Verbalized              Time Calculation:   Start Time: 1100  Stop Time: 1205  Time Calculation (min): 65 min    Therapy Charges for Today     Code Description Service Date Service Provider Modifiers Qty    94759252338 HC PT HOT OR COLD PACK TREAT MCARE 1/25/2018 Daphne Cates, PT GP 1    57172481243 HC PT MANUAL THERAPY EA 15 MIN 1/25/2018 Daphne Cates, PT GP 2                    Daphne Cates, PT  1/25/2018

## 2018-02-01 ENCOUNTER — HOSPITAL ENCOUNTER (OUTPATIENT)
Dept: PHYSICAL THERAPY | Facility: HOSPITAL | Age: 73
Setting detail: THERAPIES SERIES
Discharge: HOME | End: 2018-02-01

## 2018-02-01 DIAGNOSIS — M25.50 ARTHRALGIA, UNSPECIFIED JOINT: ICD-10-CM

## 2018-02-01 DIAGNOSIS — M54.2 NECK PAIN: Primary | ICD-10-CM

## 2018-02-01 DIAGNOSIS — M15.9 OSTEOARTHROSIS, GENERALIZED, INVOLVING MULTIPLE SITES: ICD-10-CM

## 2018-02-01 PROCEDURE — 97140 MANUAL THERAPY 1/> REGIONS: CPT

## 2018-02-01 NOTE — THERAPY TREATMENT NOTE
"    Outpatient Physical Therapy Ortho Treatment Note  ASHER Wiley     Patient Name: Roque Lepe  : 1945  MRN: [id number]  Today's Date: 2018      Visit Date: 2018    Visit Dx:    ICD-10-CM ICD-9-CM   1. Neck pain M54.2 723.1   2. Arthralgia, unspecified joint M25.50 719.40   3. Osteoarthrosis, generalized, involving multiple sites M15.9 715.09       There is no problem list on file for this patient.       Past Medical History:   Diagnosis Date   • Arthritis    • Coronary artery disease    • Diabetes mellitus    • HA (headache)    • Hypertension         Past Surgical History:   Procedure Laterality Date   • JOINT REPLACEMENT Bilateral     JENNIFER   • PACEMAKER IMPLANTATION               PT Ortho       18 1100    Subjective Comments    Subjective Comments Patient reports that he saw the neck specialist and he x-rayed his neck and \"he said all the bones were intact.\" \"I'm supposed to have an x-ray of my right shoulder some time too.\" \"He gave me some new medicine that I  today; I think it's a narcotic pain medicine.\" \"He also gave me an order for PT.\"  \"I get about 3-4 hours of relief now after treatments; which is longer than it was.\"   -LN    Precautions and Contraindications    Precautions/Limitations pacemaker  -LN    Precautions NO ES or US SECONDARY TO PACEMAKER  -LN    Contraindications NO ES SECONDARY TO PACEMAKER  -LN    Subjective Pain    Able to rate subjective pain? yes  -LN    Pre-Treatment Pain Level 6  -LN    Post-Treatment Pain Level 5  -LN    General Head/Neck/Trunk Assessment    ROM Detail Right shoulder AAROM/PROM WFL for flexion and abduction and no pain reported except soreness at end-range.  -LN      User Key  (r) = Recorded By, (t) = Taken By, (c) = Cosigned By    Initials Name Provider Type    LN Daphne Cates, PT Physical Therapist                            PT Assessment/Plan       18 1100       PT Assessment    Assessment Comments Patient still with " "minimal to moderate muscle guarding noted; primarily right UT/midscapula area; decreased after treatment.  Right deltoid muscle very tender today.  He is getting longer lasting benefit from treatments.   -LN     PT Plan    PT Frequency 1x/week  -LN     PT Plan Comments Continue 1 x week per P.O.C. Patient returns to PCP on 2/13/2018.   -LN       User Key  (r) = Recorded By, (t) = Taken By, (c) = Cosigned By    Initials Name Provider Type    ANTONIO Cates, PT Physical Therapist                Modalities       02/01/18 1100          Moist Heat    MH Applied Yes  -LN      Location Bilateral UT/shoulders and back with patient supine in hooklying.   with HOB elevated; and to left hip area  -LN      Rx Minutes Other:   20 minutes  -LN      MH Prior to Rx Yes  -LN        User Key  (r) = Recorded By, (t) = Taken By, (c) = Cosigned By    Initials Name Provider Type    ANTONIO Cates, PT Physical Therapist                Exercises       02/01/18 1100          Subjective Comments    Subjective Comments Patient reports that he saw the neck specialist and he x-rayed his neck and \"he said all the bones were intact.\" \"I'm supposed to have an x-ray of my right shoulder some time too.\" \"He gave me some new medicine that I  today; I think it's a narcotic pain medicine.\" \"He also gave me an order for PT.\"  \"I get about 3-4 hours of relief now after treatments; which is longer than it was.\"   -LN      Subjective Pain    Able to rate subjective pain? yes  -LN      Pre-Treatment Pain Level 6  -LN      Post-Treatment Pain Level 5  -LN        User Key  (r) = Recorded By, (t) = Taken By, (c) = Cosigned By    Initials Name Provider Type    ANTONIO Cates, PT Physical Therapist                        Manual Rx (last 36 hours)      Manual Treatments       02/01/18 1100          Manual Rx 1    Manual Rx 1 Location Bilateral UT/levator/cervical PS/OA area/deltoid with patient seated in chair  -LN      " Manual Rx 1 Type STM/trigger point massage with patient seated in chair  -LN      Manual Rx 1 Duration 10 minutes  -LN      Manual Rx 2    Manual Rx 2 Location gentle manual Cervical traction in sitting  -LN      Manual Rx 2 Duration 8 x 10 seconds  -LN      Manual Rx 3    Manual Rx 3 Location gentle passive SCM stretch on right  -LN      Manual Rx 3 Duration 3 x 10 seconds each  -LN      Manual Rx 4    Manual Rx 4 Location AA cervical SB   seated; with gentle passive UT stretch  -LN      Manual Rx 4 Grade gentle  -LN      Manual Rx 4 Duration 3 x each  -LN      Manual Rx 5    Manual Rx 5 Location gentle A-P mobs C7- T8 with patient seated.   at spinous processes and transverse processess  -LN      Manual Rx 5 Duration 2 minutes  -LN      Manual Rx 6    Manual Rx 6 Location passive levator scapula stretch  -LN      Manual Rx 6 Grade gentle  -LN      Manual Rx 6 Duration 2 x 10 seconds each  -LN      Manual Rx 7    Manual Rx 7 Location Passive cervical side glides as tolerated  -LN      Manual Rx 7 Grade gentle  -LN      Manual Rx 7 Duration 2 minutes   -LN      Manual Rx 8    Manual Rx 8 Location right shoulder AAROM  -LN      Manual Rx 8 Type for flexion & abduction seated in chair  -LN      Manual Rx 8 Duration 5 x each  -LN        User Key  (r) = Recorded By, (t) = Taken By, (c) = Cosigned By    Initials Name Provider Type    LN Daphne Cates, PT Physical Therapist              Therapy Education  Given: HEP, Symptoms/condition management, Pain management  Program: Reinforced  How Provided: Verbal  Provided to: Patient  Level of Understanding: Teach back education performed, Verbalized              Time Calculation:   Start Time: 1100  Stop Time: 1200  Time Calculation (min): 60 min    Therapy Charges for Today     Code Description Service Date Service Provider Modifiers Qty    96818985068 HC PT HOT OR COLD PACK TREAT MCARE 2/1/2018 Daphne Cates, PT GP 1    66179321783 HC PT MANUAL THERAPY EA 15  MIN 2/1/2018 Daphne Cates, PT GP 2                    Daphne Cates, PT  2/1/2018

## 2018-02-08 ENCOUNTER — HOSPITAL ENCOUNTER (OUTPATIENT)
Dept: PHYSICAL THERAPY | Facility: HOSPITAL | Age: 73
Setting detail: THERAPIES SERIES
Discharge: HOME | End: 2018-02-08

## 2018-02-08 DIAGNOSIS — M25.50 ARTHRALGIA, UNSPECIFIED JOINT: ICD-10-CM

## 2018-02-08 DIAGNOSIS — G89.29 CHRONIC RIGHT SHOULDER PAIN: ICD-10-CM

## 2018-02-08 DIAGNOSIS — M25.511 CHRONIC RIGHT SHOULDER PAIN: ICD-10-CM

## 2018-02-08 DIAGNOSIS — M67.911 ROTATOR CUFF DYSFUNCTION, RIGHT: ICD-10-CM

## 2018-02-08 DIAGNOSIS — M54.2 NECK PAIN: Primary | ICD-10-CM

## 2018-02-08 PROCEDURE — 97161 PT EVAL LOW COMPLEX 20 MIN: CPT

## 2018-02-08 PROCEDURE — 97140 MANUAL THERAPY 1/> REGIONS: CPT
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
## 2018-02-13 ENCOUNTER — HOSPITAL ENCOUNTER (OUTPATIENT)
Dept: PHYSICAL THERAPY | Facility: HOSPITAL | Age: 73
Setting detail: THERAPIES SERIES
Discharge: HOME | End: 2018-02-13

## 2018-02-13 DIAGNOSIS — M25.511 CHRONIC RIGHT SHOULDER PAIN: Primary | ICD-10-CM

## 2018-02-13 DIAGNOSIS — M67.911 ROTATOR CUFF DYSFUNCTION, RIGHT: ICD-10-CM

## 2018-02-13 DIAGNOSIS — G89.29 CHRONIC RIGHT SHOULDER PAIN: Primary | ICD-10-CM

## 2018-02-13 PROCEDURE — 97110 THERAPEUTIC EXERCISES: CPT

## 2018-02-13 PROCEDURE — 97140 MANUAL THERAPY 1/> REGIONS: CPT

## 2018-02-13 NOTE — THERAPY TREATMENT NOTE
"    Outpatient Physical Therapy Ortho Treatment Note  [facility]     Patient Name: Roque Lepe  : 1945  MRN: [id number]  Today's Date: 2018      Visit Date: 2018    Visit Dx:    ICD-10-CM ICD-9-CM   1. Chronic right shoulder pain M25.511 719.41    G89.29 338.29   2. Rotator cuff dysfunction, right M67.911 726.10       There is no problem list on file for this patient.       Past Medical History:   Diagnosis Date   • Arthritis    • Coronary artery disease    • Diabetes mellitus    • HA (headache)    • Hypertension         Past Surgical History:   Procedure Laterality Date   • JOINT REPLACEMENT Bilateral     JENNIFER   • PACEMAKER IMPLANTATION               PT Ortho       18 1200    Subjective Comments    Subjective Comments Patient reports that \"I'm about the same.\"  He reports that he didn't sleep well last night because every time he rolled over on his right shoulder, it would wake up.   -LN    Precautions and Contraindications    Precautions/Limitations seizure precautions  -LN    Precautions NO ES or US SECONDARY TO PACEMAKER  -LN    Contraindications NO ES SECONDARY TO PACEMAKER  -LN    Subjective Pain    Able to rate subjective pain? yes  -LN    Pre-Treatment Pain Level 5  -LN    Post-Treatment Pain Level 4  -LN    Subjective Pain Comment \"It feels better than when I came in.\"   -LN      User Key  (r) = Recorded By, (t) = Taken By, (c) = Cosigned By    Initials Name Provider Type    ANTONIO Cates, PT Physical Therapist                            PT Assessment/Plan       18 1300       PT Assessment    Assessment Comments Patient continues with c/o right shoulder pain with tenderness right shoulder/upper arm muscles. Fair tolerance to exercises and P/AAROM.  Patient with disturbed sleep secondary to not being able to tolerate lying on right side secondary to shoulder pain.  Moderate muscle guarding noted right midscapula area and right deltoid/biceps area.   -LN     PT Plan " "   PT Frequency 2x/week  -LN     PT Plan Comments Continue 2 x week for right shoulder & 1 x week for neck. Progress exercises as tolerated.  Kinesiotape shoulder next visit.   -LN       User Key  (r) = Recorded By, (t) = Taken By, (c) = Cosigned By    Initials Name Provider Type    ANTONIO Cates, PT Physical Therapist                Modalities       02/13/18 1300          Moist Heat    MH Applied Yes  -LN      Location Bilateral UT/shoulders and back with patient supine in hooklying.   with HOB elevated; and to left hip area  -LN      Rx Minutes Other:   20 minutes  -LN      MH Prior to Rx Yes  -LN        User Key  (r) = Recorded By, (t) = Taken By, (c) = Cosigned By    Initials Name Provider Type    ANTONIO Cates, PT Physical Therapist                Exercises       02/13/18 1300 02/13/18 1200       Subjective Comments    Subjective Comments  Patient reports that \"I'm about the same.\"  He reports that he didn't sleep well last night because every time he rolled over on his right shoulder, it would wake up.   -LN     Subjective Pain    Able to rate subjective pain?  yes  -LN     Pre-Treatment Pain Level  5  -LN     Post-Treatment Pain Level  4  -LN     Subjective Pain Comment  \"It feels better than when I came in.\"   -LN     Exercise 1    Exercise Name 1 pendulums cw & ccw  -LN      Cueing 1 Verbal;Tactile  -LN      Reps 1 10   each  -LN      Exercise 2    Exercise Name 2 cane exercise for shoulder ER   elbow at side  -LN      Cueing 2 Verbal;Tactile;Demo  -LN      Reps 2 10  -LN      Time (Seconds) 2 5 seconds  -LN      Exercise 3    Exercise Name 3 cane exercise for flexion seated  -LN      Cueing 3 Verbal;Demo  -LN      Reps 3 10  -LN      Time (Seconds) 3 5 seconds  -LN      Exercise 4    Exercise Name 4 active saws  -LN      Cueing 4 Verbal;Demo  -LN      Reps 4 10  -LN        User Key  (r) = Recorded By, (t) = Taken By, (c) = Cosigned By    Initials Name Provider Type    ANTONIO Serna" Mavis Cates, PT Physical Therapist                        Manual Rx (last 36 hours)      Manual Treatments       02/13/18 1300          Manual Rx 1    Manual Rx 1 Location Bilateral UT/levator/cervical PS/OA area/deltoid with patient seated in chair  -LN      Manual Rx 1 Type STM/MFR with patient seated in chair  -LN      Manual Rx 1 Duration 8 minutes  -LN      Manual Rx 7    Manual Rx 7 Location --  -LN      Manual Rx 7 Grade --  -LN      Manual Rx 7 Duration --  -LN      Manual Rx 8    Manual Rx 8 Location right shoulder AAROM/PROM   difficulty getting patient to relax during this  -LN      Manual Rx 8 Type for flexion & abduction/scaption, IR, & ER  with patient supine  -LN      Manual Rx 8 Grade gentle- to tolerance  -LN      Manual Rx 8 Duration 10 x each  -LN        User Key  (r) = Recorded By, (t) = Taken By, (c) = Cosigned By    Initials Name Provider Type    ANTONIO Cates, PT Physical Therapist              Therapy Education  Education Details: Patient to continue to work on shoulder exercises 1-2 x day.   Given: HEP, Symptoms/condition management, Pain management  Program: New (added active saws and seated cane exercise for shoulder flexion)  How Provided: Verbal, Demonstration, Written  Provided to: Patient  Level of Understanding: Teach back education performed, Verbalized, Demonstrated              Time Calculation:   Start Time: 1255  Stop Time: 1400  Time Calculation (min): 65 min    Therapy Charges for Today     Code Description Service Date Service Provider Modifiers Qty    98894983354 HC PT HOT OR COLD PACK TREAT MCARE 2/13/2018 Daphne Cates, PT GP 1    48116254762 HC PT MANUAL THERAPY EA 15 MIN 2/13/2018 Daphne Cates, PT GP 1    05722300048 HC PT THER PROC EA 15 MIN 2/13/2018 Daphne Cates, PT GP 1                    Daphne Cates, PT  2/13/2018

## 2018-02-15 ENCOUNTER — HOSPITAL ENCOUNTER (OUTPATIENT)
Dept: PHYSICAL THERAPY | Facility: HOSPITAL | Age: 73
Setting detail: THERAPIES SERIES
Discharge: HOME | End: 2018-02-15

## 2018-02-15 DIAGNOSIS — M67.911 ROTATOR CUFF DYSFUNCTION, RIGHT: ICD-10-CM

## 2018-02-15 DIAGNOSIS — M25.511 CHRONIC RIGHT SHOULDER PAIN: Primary | ICD-10-CM

## 2018-02-15 DIAGNOSIS — G89.29 CHRONIC RIGHT SHOULDER PAIN: Primary | ICD-10-CM

## 2018-02-15 DIAGNOSIS — M54.2 NECK PAIN: ICD-10-CM

## 2018-02-15 PROCEDURE — 97140 MANUAL THERAPY 1/> REGIONS: CPT

## 2018-02-15 NOTE — THERAPY TREATMENT NOTE
"    Outpatient Physical Therapy Ortho Treatment Note  ASHER Wiley     Patient Name: Roque Lepe  : 1945  MRN: [id number]  Today's Date: 2/15/2018      Visit Date: 02/15/2018    Visit Dx:    ICD-10-CM ICD-9-CM   1. Chronic right shoulder pain M25.511 719.41    G89.29 338.29   2. Rotator cuff dysfunction, right M67.911 726.10   3. Neck pain M54.2 723.1       There is no problem list on file for this patient.       Past Medical History:   Diagnosis Date   • Arthritis    • Coronary artery disease    • Diabetes mellitus    • HA (headache)    • Hypertension         Past Surgical History:   Procedure Laterality Date   • JOINT REPLACEMENT Bilateral     JENNIFER   • PACEMAKER IMPLANTATION               PT Ortho       02/15/18 1100    Subjective Comments    Subjective Comments Patient reports that he is feeling about the same. \"My shoulder is more sore.\"   -LN    Precautions and Contraindications    Precautions/Limitations pacemaker (previous note stated seizure precaution which was put in by error) -LN    Precautions NO ES or US SECONDARY TO PACEMAKER  -LN    Contraindications NO ES SECONDARY TO PACEMAKER  -LN    Subjective Pain    Able to rate subjective pain? yes  -LN    Pre-Treatment Pain Level 5  -LN    Post-Treatment Pain Level 4  -LN    Subjective Pain Comment He reports decreased pain after session.   -LN      02/15/18 1000    Precautions and Contraindications    Precautions/Limitations --  -LN    Precautions --  -LN    Contraindications --  -LN    Subjective Pain    Able to rate subjective pain? --  -LN      18 1200    Subjective Comments    Subjective Comments Patient reports that \"I'm about the same.\"  He reports that he didn't sleep well last night because every time he rolled over on his right shoulder, it would wake up.   -LN    Precautions and Contraindications    Precautions/Limitations pacemaker  -LN    Precautions NO ES or US SECONDARY TO PACEMAKER  -LN    Contraindications NO ES SECONDARY TO " "PACEMAKER  -LN    Subjective Pain    Able to rate subjective pain? yes  -LN    Pre-Treatment Pain Level 5  -LN    Post-Treatment Pain Level 4  -LN    Subjective Pain Comment \"It feels better than when I came in.\"   -LN      User Key  (r) = Recorded By, (t) = Taken By, (c) = Cosigned By    Initials Name Provider Type    ANTONIO Cates, PT Physical Therapist                            PT Assessment/Plan       02/15/18 1300       PT Assessment    Assessment Comments Patient continues with c/o right shoulder pain but he did tolerate PROM better in sitting vs. supine- able to relax better.  Minimal to moderate muscle guarding noted right UT/MT/deltoid/biceps with tenderness.  Patient still unable to lie on right shoulder/side. Patient is very tender at supraspinatus tendon insertion and feel patient has signs of possible RC involvement.   -LN     PT Plan    PT Frequency 2x/week  -LN     PT Plan Comments Continue 2 x week for right shoulder & 1 x week for neck. Progress exercises as tolerated.   -LN       User Key  (r) = Recorded By, (t) = Taken By, (c) = Cosigned By    Initials Name Provider Type    ANTONIO Cates, PT Physical Therapist                Modalities       02/15/18 1100        Moist Heat    MH Applied Yes  -LN      Location Bilateral UT/shoulders and back with patient supine in hooklying.   with HOB elevated; and to left hip area  -LN      Rx Minutes Other:   20 minutes  -LN      MH Prior to Rx Yes  -LN      Other Treatment Provided    Taping / Bracing Kinesiotape applied to right shoulder using RC pain technique with 1 \"Y\" strip and 1 \"I strip for spatial correction. Reviewed use and care of tape with patient, including safe removal of tape. Patient to leave tape on up to 5 days and to trim/remove tape as needed.   -LN        User Key  (r) = Recorded By, (t) = Taken By, (c) = Cosigned By    Initials Name Provider Type    ANTONIO Cates PT Physical Therapist              " "  Exercises       02/15/18 1100 02/15/18 1000       Subjective Comments    Subjective Comments Patient reports that he is feeling about the same. \"My shoulder is more sore.\"   -LN      Subjective Pain    Able to rate subjective pain? yes  -LN --  -LN     Pre-Treatment Pain Level 5  -LN      Post-Treatment Pain Level 4  -LN      Subjective Pain Comment He reports decreased pain after session.   -LN        User Key  (r) = Recorded By, (t) = Taken By, (c) = Cosigned By    Initials Name Provider Type    LN Daphne Cates, PT Physical Therapist                        Manual Rx (last 36 hours)      Manual Treatments       02/15/18 0900          Manual Rx 1    Manual Rx 1 Location Bilateral UT/levator/cervical PS/OA area/deltoid with patient seated in chair  -LN      Manual Rx 1 Type STM/trigger point massage with patient seated in chair  -LN      Manual Rx 1 Duration 10 minutes  -LN      Manual Rx 2    Manual Rx 2 Location gentle manual Cervical traction in sitting  -LN      Manual Rx 2 Duration 6 x 10 seconds  -LN      Manual Rx 3    Manual Rx 3 Location gentle passive SCM stretch on right  -LN      Manual Rx 3 Duration 3 x 10 seconds each  -LN      Manual Rx 4    Manual Rx 4 Location AA cervical SB   seated; with gentle passive UT stretch  -LN      Manual Rx 4 Grade gentle  -LN      Manual Rx 4 Duration 3 x each  -LN      Manual Rx 5    Manual Rx 5 Location gentle A-P mobs C7- T8 with patient seated.   at spinous processes and transverse processess  -LN      Manual Rx 5 Duration 2 minutes  -LN      Manual Rx 7    Manual Rx 7 Location right shoulder distraction  -LN      Manual Rx 7 Grade gentle   -LN      Manual Rx 7 Duration 3 x 5 sec   had some discomfort with this  -LN      Manual Rx 8    Manual Rx 8 Location right shoulder AAROM/PROM   difficulty getting patient to relax during this  -LN      Manual Rx 8 Type for flexion & abduction/scaption seated, and IR & ER  with patient supine  -LN      Manual Rx 8 " Grade gentle- to tolerance  -LN      Manual Rx 8 Duration 10 x each  -LN        User Key  (r) = Recorded By, (t) = Taken By, (c) = Cosigned By    Initials Name Provider Type    LN Daphne Cates, PT Physical Therapist              Therapy Education  Education Details: Patient to continue with HEP as tolerated.   Given: HEP, Symptoms/condition management, Pain management  Program: Reinforced  How Provided: Verbal  Provided to: Patient  Level of Understanding: Teach back education performed, Verbalized, Demonstrated              Time Calculation:   Start Time: 1050  Stop Time: 1200  Time Calculation (min): 70 min    Therapy Charges for Today     Code Description Service Date Service Provider Modifiers Qty    73449965249 HC PT HOT OR COLD PACK TREAT [facility] 2/15/2018 Daphne Cates, PT GP 1    54712327579 HC PT MANUAL THERAPY EA 15 MIN 2/15/2018 Daphne Cates, PT GP 2                    Daphne Cates, PT  2/15/2018

## 2018-02-20 ENCOUNTER — HOSPITAL ENCOUNTER (OUTPATIENT)
Dept: PHYSICAL THERAPY | Facility: HOSPITAL | Age: 73
Setting detail: THERAPIES SERIES
Discharge: HOME | End: 2018-02-20

## 2018-02-20 DIAGNOSIS — M25.511 CHRONIC RIGHT SHOULDER PAIN: Primary | ICD-10-CM

## 2018-02-20 DIAGNOSIS — G89.29 CHRONIC RIGHT SHOULDER PAIN: Primary | ICD-10-CM

## 2018-02-20 DIAGNOSIS — M67.911 ROTATOR CUFF DYSFUNCTION, RIGHT: ICD-10-CM

## 2018-02-20 PROCEDURE — 97140 MANUAL THERAPY 1/> REGIONS: CPT

## 2018-02-20 PROCEDURE — 97110 THERAPEUTIC EXERCISES: CPT

## 2018-02-20 NOTE — THERAPY TREATMENT NOTE
Outpatient Physical Therapy Ortho Treatment Note  ASHER Wiley     Patient Name: Roque Lepe  : 1945  MRN: [id number]  Today's Date: 2018      Visit Date: 2018    Visit Dx:    ICD-10-CM ICD-9-CM   1. Chronic right shoulder pain M25.511 719.41    G89.29 338.29   2. Rotator cuff dysfunction, right M67.911 726.10       There is no problem list on file for this patient.       Past Medical History:   Diagnosis Date   • Arthritis    • Coronary artery disease    • Diabetes mellitus    • HA (headache)    • Hypertension         Past Surgical History:   Procedure Laterality Date   • JOINT REPLACEMENT Bilateral     JENNIFER   • PACEMAKER IMPLANTATION               PT Ortho       18 1100    Subjective Comments    Subjective Comments Pt feels like the tape may have helped but part of the tape came off ; pt feels like his shoulder is doing better  -    Precautions and Contraindications    Precautions/Limitations pacemaker  -    Precautions NO ES or US secondary to pacemaker  -      User Key  (r) = Recorded By, (t) = Taken By, (c) = Cosigned By    Initials Name Provider Type     Rolly Wolff PTA Physical Therapy Assistant                            PT Assessment/Plan       18 1524       PT Assessment    Assessment Comments Complaints of mid deltoid pain end range of PROM flexion, otherwise tolerated PROM in all planes  with pt reporting increased ROM  -     PT Plan    PT Plan Comments Continue per POC (right shoulder 2x/week and neck 1x/week)  -       User Key  (r) = Recorded By, (t) = Taken By, (c) = Cosigned By    Initials Name Provider Type     Rolly Wolff PTA Physical Therapy Assistant                Modalities       18 1100          Moist Heat    Location Bilateral UT/shoulders and back with patient supine in hooklying.   with HOB elevated; and to left hip area  -      Rx Minutes 15 mins  -      MH Prior to Rx Yes  -      Other Treatment Provided    Taping /  Bracing Kinesiotape held this visit with plan to tape next session per pt  -        User Key  (r) = Recorded By, (t) = Taken By, (c) = Cosigned By    Initials Name Provider Type     Rolly Wolff PTA Physical Therapy Assistant                Exercises       02/20/18 1100          Subjective Comments    Subjective Comments Pt feels like the tape may have helped but part of the tape came off Sunday; pt feels like his shoulder is doing better  -      Exercise 1    Exercise Name 1 pendulums cw & ccw  -      Cueing 1 Verbal;Tactile  -      Reps 1 10   each  -      Exercise 2    Exercise Name 2 cane exercise for shoulder ER   elbow at side  -      Cueing 2 Verbal  -      Reps 2 10  -MH      Time (Seconds) 2 5 seconds  -      Exercise 3    Exercise Name 3 cane exercise for flexion seated  -      Cueing 3 Verbal  -      Reps 3 10  -      Time (Seconds) 3 5 seconds  -      Exercise 4    Exercise Name 4 active saws  -      Cueing 4 Verbal  -      Reps 4 10  -MH        User Key  (r) = Recorded By, (t) = Taken By, (c) = Cosigned By    Initials Name Provider Type     Rolly Wolff PTA Physical Therapy Assistant                        Manual Rx (last 36 hours)      Manual Treatments       02/20/18 1100          Manual Rx 1    Manual Rx 1 Location (R) UT/levator/cervical PS/OA area/deltoid with patient seated in chair  -      Manual Rx 1 Type STM/MFR with patient seated in chair  -      Manual Rx 1 Duration 8 minutes  -      Manual Rx 2    Manual Rx 2 Location (R) shoulder - pt seated  -      Manual Rx 2 Type AAROM/PROM: flex, abd, ER, IR  -      Manual Rx 2 Duration 10 reps each  -        User Key  (r) = Recorded By, (t) = Taken By, (c) = Cosigned By    Initials Name Provider Type     Rolly Wolff PTA Physical Therapy Assistant              Therapy Education  Given: HEP, Symptoms/condition management  Program: Reinforced  How Provided: Verbal  Provided to: Patient  Level of  Understanding: Teach back education performed, Verbalized, Demonstrated              Time Calculation:   Start Time: 1100  Stop Time: 1203  Time Calculation (min): 63 min    Therapy Charges for Today     Code Description Service Date Service Provider Modifiers Qty    98167724887 HC PT HOT OR COLD PACK TREAT [facility] 2/20/2018 Rolly Wolff, PTA GP 1    71011306857 HC PT MANUAL THERAPY EA 15 MIN 2/20/2018 Rolly Wolff, PTA GP 1    44713666433 HC PT THER PROC EA 15 MIN 2/20/2018 Rolly Wolff, PTA GP 1                    Rolly Wolff, RENE  2/20/2018

## 2018-02-22 ENCOUNTER — HOSPITAL ENCOUNTER (OUTPATIENT)
Dept: PHYSICAL THERAPY | Facility: HOSPITAL | Age: 73
Setting detail: THERAPIES SERIES
Discharge: HOME | End: 2018-02-22

## 2018-02-22 DIAGNOSIS — G89.29 CHRONIC RIGHT SHOULDER PAIN: Primary | ICD-10-CM

## 2018-02-22 DIAGNOSIS — M54.2 NECK PAIN: ICD-10-CM

## 2018-02-22 DIAGNOSIS — M67.911 ROTATOR CUFF DYSFUNCTION, RIGHT: ICD-10-CM

## 2018-02-22 DIAGNOSIS — M25.511 CHRONIC RIGHT SHOULDER PAIN: Primary | ICD-10-CM

## 2018-02-22 PROCEDURE — 97140 MANUAL THERAPY 1/> REGIONS: CPT

## 2018-02-22 NOTE — THERAPY TREATMENT NOTE
Outpatient Physical Therapy Ortho Treatment Note  ASHER Wiley     Patient Name: Roque Lepe  : 1945  MRN: [id number]  Today's Date: 2018      Visit Date: 2018    Visit Dx:    ICD-10-CM ICD-9-CM   1. Chronic right shoulder pain M25.511 719.41    G89.29 338.29   2. Rotator cuff dysfunction, right M67.911 726.10   3. Neck pain M54.2 723.1       There is no problem list on file for this patient.       Past Medical History:   Diagnosis Date   • Arthritis    • Coronary artery disease    • Diabetes mellitus    • HA (headache)    • Hypertension         Past Surgical History:   Procedure Laterality Date   • JOINT REPLACEMENT Bilateral     JENNIFER   • PACEMAKER IMPLANTATION               PT Ortho       18 1100    Subjective Comments    Subjective Comments Patient reports that his shoulder is moving better and he thinks his neck is not as tight.   -LN    Precautions and Contraindications    Precautions/Limitations pacemaker  -LN    Precautions NO ES or US secondary to pacemaker  -LN    Subjective Pain    Able to rate subjective pain? yes  -LN    Pre-Treatment Pain Level 5  -LN    Post-Treatment Pain Level 4  -LN    Right Shoulder    Flexion PROM Deficit 170   minimal pain at end-range  -LN    ABduction PROM Deficit 160   scaption  -LN    External Rotation PROM Deficit 70   discomfort at end-range  -LN    Internal Rotation PROM Deficit 90  -LN      18 1100    Subjective Comments    Subjective Comments Pt feels like the tape may have helped but part of the tape came off ; pt feels like his shoulder is doing better  -    Precautions and Contraindications    Precautions/Limitations pacemaker  -MH    Precautions NO ES or US secondary to pacemaker  -MH      User Key  (r) = Recorded By, (t) = Taken By, (c) = Cosigned By    Initials Name Provider Type     Rolly Wolff, PTA Physical Therapy Assistant    LN Daphne Cates, PT Physical Therapist                            PT  "Assessment/Plan       02/22/18 1200       PT Assessment    Assessment Comments Patient with improved AROM/PROM right shoulder with less pain. Overall decreased muscle tightness noted with only minimal noted right UT and midscapula area, as well as right deltoid muscle.  Patient is still very tender at right mid deltoid muscle as well as supraspinatus tendon area.   -LN     PT Plan    PT Frequency 2x/week  -LN     PT Plan Comments Continue per POC (right shoulder 2x/week and neck 1x/week). Measure right shoulder AROM next visit.   -LN       User Key  (r) = Recorded By, (t) = Taken By, (c) = Cosigned By    Initials Name Provider Type    ANTONIO Cates, PT Physical Therapist                Modalities       02/22/18 1100          Moist Heat    MH Applied Yes  -LN      Location Bilateral UT/shoulders and back with patient supine in hooklying.   with HOB elevated; and to left hip area  -LN      Rx Minutes 15 mins  -LN      MH Prior to Rx --  -LN       S/P Rx Yes  -LN      Other Treatment Provided    Taping / Bracing Kinesiotape applied to right shoulder using RC pain technique with 1 \"Y\" strip and 1 \"I strip for spatial correction. Reviewed use and care of tape with patient, including safe removal of tape. Patient to leave tape on up to 5 days and to trim/remove tape as needed.   -LN        User Key  (r) = Recorded By, (t) = Taken By, (c) = Cosigned By    Initials Name Provider Type    ANTONIO Cates, PT Physical Therapist                Exercises       02/22/18 1100          Subjective Comments    Subjective Comments Patient reports that his shoulder is moving better and he thinks his neck is not as tight.   -LN      Subjective Pain    Able to rate subjective pain? yes  -LN      Pre-Treatment Pain Level 5  -LN      Post-Treatment Pain Level 4  -LN        User Key  (r) = Recorded By, (t) = Taken By, (c) = Cosigned By    Initials Name Provider Type    ANTONIO Cates, PT Physical Therapist "                        Manual Rx (last 36 hours)      Manual Treatments       02/22/18 1100          Manual Rx 1    Manual Rx 1 Location Bilateral UT/levator/cervical PS/OA area/deltoid with patient seated in chair  -LN      Manual Rx 1 Type STM/trigger point massage with patient seated in chair  -LN      Manual Rx 1 Duration 10 minutes  -LN      Manual Rx 2    Manual Rx 2 Location gentle manual Cervical traction in sitting  -LN      Manual Rx 2 Duration 6 x 10 seconds  -LN      Manual Rx 3    Manual Rx 3 Location gentle passive SCM stretch on right  -LN      Manual Rx 3 Duration 3 x 10 seconds each  -LN      Manual Rx 4    Manual Rx 4 Location AA cervical SB   seated; with gentle passive UT stretch  -LN      Manual Rx 4 Grade gentle  -LN      Manual Rx 4 Duration 3 x each  -LN      Manual Rx 5    Manual Rx 5 Location gentle A-P mobs C7- T8 with patient seated.   at spinous processes and transverse processess  -LN      Manual Rx 5 Duration 2 minutes  -LN      Manual Rx 7    Manual Rx 7 Location right shoulder distraction  -LN      Manual Rx 7 Grade gentle   -LN      Manual Rx 7 Duration 3 x 5 sec   had some discomfort with this  -LN      Manual Rx 8    Manual Rx 8 Location right shoulder AAROM/PROM  -LN      Manual Rx 8 Type for flexion & abduction/scaption seated, and IR & ER  with patient supine.  -LN      Manual Rx 8 Grade gentle- to tolerance  -LN      Manual Rx 8 Duration 10 x each  -LN        User Key  (r) = Recorded By, (t) = Taken By, (c) = Cosigned By    Initials Name Provider Type    LN Daphne Cates, PT Physical Therapist              Therapy Education  Given: HEP, Symptoms/condition management  Program: Reinforced  How Provided: Verbal  Provided to: Patient  Level of Understanding: Teach back education performed, Verbalized              Time Calculation:   Start Time: 1100  Stop Time: 1215  Time Calculation (min): 75 min    Therapy Charges for Today     Code Description Service Date Service  Provider Modifiers Qty    85779340575 HC PT HOT OR COLD PACK TREAT [facility] 2/22/2018 Daphne Cates, PT GP 1    39780260404 HC PT MANUAL THERAPY EA 15 MIN 2/22/2018 Daphne Cates, PT GP 2                    Daphne Cates, PT  2/22/2018

## 2018-02-27 ENCOUNTER — HOSPITAL ENCOUNTER (OUTPATIENT)
Dept: PHYSICAL THERAPY | Facility: HOSPITAL | Age: 73
Setting detail: THERAPIES SERIES
Discharge: HOME | End: 2018-02-27

## 2018-02-27 DIAGNOSIS — M67.911 ROTATOR CUFF DYSFUNCTION, RIGHT: ICD-10-CM

## 2018-02-27 DIAGNOSIS — M25.511 CHRONIC RIGHT SHOULDER PAIN: Primary | ICD-10-CM

## 2018-02-27 DIAGNOSIS — G89.29 CHRONIC RIGHT SHOULDER PAIN: Primary | ICD-10-CM

## 2018-02-27 PROCEDURE — 97110 THERAPEUTIC EXERCISES: CPT

## 2018-02-27 PROCEDURE — 97140 MANUAL THERAPY 1/> REGIONS: CPT

## 2018-02-27 NOTE — THERAPY TREATMENT NOTE
Outpatient Physical Therapy Ortho Treatment Note  ASHER Wiley     Patient Name: Roque Lepe  : 1945  MRN: [id number]  Today's Date: 2018      Visit Date: 2018    Visit Dx:    ICD-10-CM ICD-9-CM   1. Chronic right shoulder pain M25.511 719.41    G89.29 338.29   2. Rotator cuff dysfunction, right M67.911 726.10       There is no problem list on file for this patient.       Past Medical History:   Diagnosis Date   • Arthritis    • Coronary artery disease    • Diabetes mellitus    • HA (headache)    • Hypertension         Past Surgical History:   Procedure Laterality Date   • JOINT REPLACEMENT Bilateral     JENNIFER   • PACEMAKER IMPLANTATION               PT Ortho       18 1100    Precautions and Contraindications    Precautions/Limitations pacemaker  -LN    Precautions NO ES or US secondary to pacemaker  -LN    Right Shoulder    Flexion PROM Deficit 175  -LN    ABduction PROM Deficit 160   discomfort at end range  -LN    External Rotation PROM Deficit 70   upper arm discomfort  -LN    Internal Rotation PROM Deficit 90  -LN      User Key  (r) = Recorded By, (t) = Taken By, (c) = Cosigned By    Initials Name Provider Type    ANTONIO Cates, PT Physical Therapist                            PT Assessment/Plan       18 1100       PT Assessment    Assessment Comments Patient still with significant tenderness with moderate muscle guarding noted right mid-deltoid and right biceps muscle; decreased tenderness noted at supraspinatus tendon area.  Improved P/AROM noted right shoulder but still with discomfort with ER and abduction.  Patient is tolerating shoulder exercises fairly well.   -LN     PT Plan    PT Frequency 2x/week  -LN     PT Plan Comments Continue per POC (right shoulder 2x/week and neck 1x/week). Measure right shoulder AROM next visit.  Measure AROM next visit.   -LN       User Key  (r) = Recorded By, (t) = Taken By, (c) = Cosigned By    Initials Name Provider Type    ANTONIO  Daphne Cates, PT Physical Therapist                Modalities       02/27/18 1100          Moist Heat    MH Applied Yes  -LN      Location Bilateral UT/shoulders and back with patient supine in hooklying.   with HOB elevated; and to left hip area  -LN      Rx Minutes 15 mins  -LN      MH S/P Rx Yes  -LN        User Key  (r) = Recorded By, (t) = Taken By, (c) = Cosigned By    Initials Name Provider Type    LN Daphne Cates, PT Physical Therapist                Exercises       02/27/18 1100          Subjective Comments    Subjective Comments Patient reports that he is doing okay but stll feels pain in right upper arm and upper trap area.   -LN      Subjective Pain    Able to rate subjective pain? yes  -LN      Pre-Treatment Pain Level 5  -LN      Post-Treatment Pain Level 4  -LN      Exercise 1    Exercise Name 1 pendululms- HEP  -LN      Exercise 2    Exercise Name 2 cane exercise for shoulder ER   elbow at side  -LN      Cueing 2 Verbal  -LN      Reps 2 10  -LN      Time (Seconds) 2 5 seconds  -LN      Exercise 3    Exercise Name 3 cane exercise for flexion seated  -LN      Cueing 3 Verbal  -LN      Reps 3 10  -LN      Time (Seconds) 3 5 seconds  -LN      Exercise 4    Exercise Name 4 active saws  -LN      Cueing 4 Verbal  -LN      Reps 4 15  -LN      Exercise 5    Exercise Name 5 cane exercise for scaption seated  -LN      Reps 5 15  -LN      Time (Seconds) 5 5 seconds  -LN        User Key  (r) = Recorded By, (t) = Taken By, (c) = Cosigned By    Initials Name Provider Type    LN Daphne Cates, PT Physical Therapist                        Manual Rx (last 36 hours)      Manual Treatments       02/27/18 1100          Manual Rx 1    Manual Rx 1 Location Right UT/shoulder/deltoid/biceps  -LN      Manual Rx 1 Type STM/MFR  -LN      Manual Rx 1 Duration 10 minutes  -LN      Manual Rx 8    Manual Rx 8 Location right shoulder AAROM/PROM   difficulty getting patient to relax during this  -LN       Manual Rx 8 Type for flexion & abduction/scaption, IR & ER  with patient seated.  -LN      Manual Rx 8 Grade gentle- to tolerance  -LN      Manual Rx 8 Duration 10 x each  -LN        User Key  (r) = Recorded By, (t) = Taken By, (c) = Cosigned By    Initials Name Provider Type    ANTONIO Cates, PT Physical Therapist              Therapy Education  Given: HEP, Symptoms/condition management  Program: New (added cane excercise for AA scaption.)  How Provided: Verbal, Demonstration  Provided to: Patient  Level of Understanding: Teach back education performed, Verbalized, Demonstrated              Time Calculation:   Start Time: 1100  Stop Time: 1200  Time Calculation (min): 60 min    Therapy Charges for Today     Code Description Service Date Service Provider Modifiers Qty    21655163167 HC PT HOT OR COLD PACK TREAT [facility] 2/27/2018 Daphne aCtes, PT GP 1    39299319787 HC PT MANUAL THERAPY EA 15 MIN 2/27/2018 Daphne Cates, PT GP 1    36590258382 HC PT THER PROC EA 15 MIN 2/27/2018 Daphne Cates, PT GP 1                    Daphne Cates, PT  2/27/2018

## 2018-03-01 ENCOUNTER — HOSPITAL ENCOUNTER (OUTPATIENT)
Dept: PHYSICAL THERAPY | Facility: HOSPITAL | Age: 73
Setting detail: THERAPIES SERIES
Discharge: HOME | End: 2018-03-01

## 2018-03-01 DIAGNOSIS — M25.511 CHRONIC RIGHT SHOULDER PAIN: Primary | ICD-10-CM

## 2018-03-01 DIAGNOSIS — G89.29 CHRONIC RIGHT SHOULDER PAIN: Primary | ICD-10-CM

## 2018-03-01 DIAGNOSIS — M67.911 ROTATOR CUFF DYSFUNCTION, RIGHT: ICD-10-CM

## 2018-03-01 DIAGNOSIS — M54.2 NECK PAIN: ICD-10-CM

## 2018-03-01 PROCEDURE — 97110 THERAPEUTIC EXERCISES: CPT

## 2018-03-01 PROCEDURE — 97140 MANUAL THERAPY 1/> REGIONS: CPT

## 2018-03-01 PROCEDURE — G8981 BODY POS CURRENT STATUS: HCPCS

## 2018-03-01 PROCEDURE — G8982 BODY POS GOAL STATUS: HCPCS

## 2018-03-01 NOTE — THERAPY RE-EVALUATION
Outpatient Physical Therapy Ortho Re-Evaluation  ASHER Wiley     Patient Name: Roque Lepe  : 1945  MRN: [id number]  Today's Date: 3/1/2018      Visit Date: 2018    There is no problem list on file for this patient.       Past Medical History:   Diagnosis Date   • Arthritis    • Coronary artery disease    • Diabetes mellitus    • HA (headache)    • Hypertension         Past Surgical History:   Procedure Laterality Date   • JOINT REPLACEMENT Bilateral     JENNIFER   • PACEMAKER IMPLANTATION         Visit Dx:     ICD-10-CM ICD-9-CM   1. Chronic right shoulder pain M25.511 719.41    G89.29 338.29   2. Rotator cuff dysfunction, right M67.911 726.10   3. Neck pain M54.2 723.1                 PT Ortho       18 1000    Subjective Comments    Subjective Comments Patient reports   -LN    Precautions and Contraindications    Precautions/Limitations pacemaker  -LN    Precautions NO ES or US secondary to pacemaker  -LN    Subjective Pain    Able to rate subjective pain? yes  -LN    Pre-Treatment Pain Level 5   5-6/10  -LN    Post-Treatment Pain Level 4  -LN    Subjective Pain Comment He reports that he usually feels better and looser for the rest of the day after a PT session.   -LN    Cervical Palpation    Suboccipital Right:;Tender  -LN    Cervical Facets Tender   evaristo C7;T1 and T5 and T6  -LN    Scalenes Right:;Tender;Guarded/taut  -LN    Spinous Process Tender  -LN    Shoulder Girdle Palpation    Subacromial Space Right:;Tender  -LN    Supraspinatus Insertion Right:;Tender;Guarded/taut  -LN    AC Joint Right:;Tender  -LN    Long Head of Biceps Right:;Tender;Guarded/taut  -LN    Short Head of Biceps Right:;Tender;Guarded/taut  -LN    Deltoid Right:;Tender;Guarded/taut  -LN    Upper Trap Right:;Tender;Guarded/taut;Trigger point;Elicits spasm  -LN    Levator Scapula Right:;Tender;Guarded/taut  -LN    Middle Trap Right:;Tender;Guarded/taut;Elicits spasm;Trigger point  -LN    Rhomboid  Right:;Tender;Guarded/taut  -LN    Neck    Flexion AROM Deficit WFL  -LN    Extension AROM Deficit 75%  -LN    Lt Lat Flexion AROM Deficit 75%  -LN    Rt Lateral Flexion AROM Deficit 75%  -LN    Lt Rotation AROM Deficit 75%  -LN    Rt Rotation AROM Deficit 75%  -LN    Right Shoulder    Flexion AROM Deficit 135  -LN    Flexion PROM Deficit 170  -LN    ABduction AROM Deficit 140  -LN    ABduction PROM Deficit 160  -LN    External Rotation AROM Deficit 45  -LN    External Rotation PROM Deficit 70  -LN    Internal Rotation AROM Deficit 80  -LN    Internal Rotation PROM Deficit 90  -LN    Right Shoulder    Flexion Gross Movement (4/5) good  -LN    Extension Gross Movement (4/5) good  -LN    ABduction Gross Movement (4/5) good  -LN    ADduction Gross Movement (4/5) good  -LN    Int Rotation Gross Movement (4+/5) good plus  -LN    Ext Rotation Gross Movement (3-/5) fair minus  -LN    Right Elbow/Forearm    Elbow Flexion Gross Movement (4/5) good  -LN    Elbow Extension Gross Movement (4/5) good  -LN    Upper Extremity Flexibility    Suboccipitals Right:;Mildly limited  -LN    Scalenes Right:;Mildly limited  -LN    SCM Right:;Mildly limited  -LN    Upper Trapezius Right:;Moderately limited  -LN    Levator Scapula Right:;Mildly limited  -LN    Pect Minor Right:;Mildly limited  -LN    Pect Major Right:;Mildly limited  -LN    Transfers    Transfers, Sit-Stand Alexander independent  -LN    Transfers, Stand-Sit Alexander independent  -LN    Gait Assessment/Treatment    Gait, Alexander Level independent  -LN    Gait, Assistive Device quad cane   uses PRN at home  -LN    Gait, Gait Deviations antalgic;ivan decreased;forward flexed posture  -LN    Gait, Comment He did not have cane with him today.  -LN      02/27/18 1100    Precautions and Contraindications    Precautions/Limitations pacemaker  -LN    Precautions NO ES or US secondary to pacemaker  -LN    Right Shoulder    Flexion PROM Deficit 175  -LN    ABduction PROM  Deficit 160   discomfort at end range  -LN    External Rotation PROM Deficit 70   upper arm discomfort  -LN    Internal Rotation PROM Deficit 90  -LN      User Key  (r) = Recorded By, (t) = Taken By, (c) = Cosigned By    Initials Name Provider Type    LN Daphne Cates, PT Physical Therapist                      Therapy Education  Education Details: Patient to continue with HEP as tolerated.   Given: HEP, Symptoms/condition management  Program: Reinforced  How Provided: Verbal, Demonstration  Provided to: Patient  Level of Understanding: Teach back education performed, Verbalized, Demonstrated           PT OP Goals       03/01/18 1100       PT Short Term Goals    STG Date to Achieve 03/15/18  -LN     STG 1 Patient to verbally report decreased pain in neck and shoulders to <5/10 with ADLs and everyday activities.  -LN     STG 1 Progress Met  -LN     STG 2 Patient to have improved CROM by 25% each plane to allow for improved neck motion with driving and ADLs.  -LN     STG 2 Progress Met  -LN     STG 3 Patient to have decreased c/o HA by 25-50%.   -LN     STG 3 Progress Met  -LN     STG 3 Progress Comments He reports still getting HA but not as bad and not as often.  -LN     STG 4 Patient to have decreased muscle guarding in neck/UT/midscapula area to minimal to nominal with improved tolerance to massage and stretching.   -LN     STG 4 Progress Met  -LN     STG 5 Patient to report 50% decrease in disturbed sleep secondary to neck pain.   -LN     STG 5 Progress Ongoing;Progressing  -LN     STG 5 Progress Comments He continues to report disturbed sleep but reports it may be a little better.  -LN     Long Term Goals    LTG Date to Achieve 03/29/18  -LN     LTG 1 Patient to verbally report decreased pain in neck and shoulders to <3/10 with ADLs and everyday activities.   -LN     LTG 1 Progress Ongoing;Progressing  -LN     LTG 1 Progress Comments Patient rated pain at 5-6/10 today; decreases to 4/10 after treatments   -LN     LTG 2 CROM WFL all planes.  -LN     LTG 2 Progress Partially Met  -LN     LTG 2 Progress Comments met for cervical flexion- all other ranges 75% today.  -LN     LTG 3 Bilateral shoulder ROM WNL and painfree.  -LN     LTG 3 Progress Partially Met  -LN     LTG 3 Progress Comments met for left ( new goals now set for right shoulder)  -LN     LTG 4 Patient independent with HEP issued by therapist.   -LN     LTG 4 Progress Ongoing;Progressing  -LN     LTG 5 Patient to have decreased c/o HA by %.   -LN     LTG 5 Progress Ongoing;Progressing  -LN     LTG 5 Progress Comments He reports still getting HA but not as bad and not as often.  -LN     LTG 6 Patient able to shoot basketball 10 times with no c/o increased pain in neck or shoulders.   -LN     LTG 6 Progress Ongoing  -LN     LTG 6 Progress Comments He has not attempted this yet; reports that he has not been able to shoot a basketball since before accident.   -LN     LTG 8 Patient to report 75% decrease in disturbed sleep secondary to neck pain.   -LN     LTG 8 Progress Ongoing;Not Met  -LN     LTG 9 Patient to be able to maintain minimal to nominal muscle guarding right/UT/MT/deltoid area.   -LN     LTG 9 Progress New  -LN     Time Calculation    PT Goal Re-Cert Due Date 03/29/18  -       User Key  (r) = Recorded By, (t) = Taken By, (c) = Cosigned By    Initials Name Provider Type    ANTONIO Cates, PT Physical Therapist                PT Assessment/Plan       03/01/18 1100       PT Assessment    Assessment Comments Patient with persistent c/o pain/soreness and muscle tightness right SCM/UT/MT and into right deltoid and biceps muscle.  Can get muscle guarding to decrease to minimal after PT session & he reports benefit for 1-2 days but then tightens back up to moderate. CROM overall improved but still limted by about 25%. Improved AROM right shoulder noted. He still has occas HA, but not as intense or as frequent. He also still reports  disturbed sleep.  He has met 4 out of 5 STG and has partially met 2 LTG and he is making progress towards all remaining goals (cervical goals).   -LN     PT Plan    PT Frequency 2x/week  -LN     PT Plan Comments Continue per POC (right shoulder 2x/week and neck 1x/week). Continue towards all remaining goals. Continue with neck 1 x week for 4 more weeks and then reassess.   -LN       User Key  (r) = Recorded By, (t) = Taken By, (c) = Cosigned By    Initials Name Provider Type    ANTONIO Cates, PT Physical Therapist                Modalities       03/01/18 1100          Moist Heat    MH Applied Yes  -LN      Location Bilateral UT/shoulders and back with patient supine in hooklying.   with HOB elevated; and to left hip area  -LN      Rx Minutes 15 mins  -LN      MH S/P Rx Yes  -LN        User Key  (r) = Recorded By, (t) = Taken By, (c) = Cosigned By    Initials Name Provider Type    ANTONIO Cates, PT Physical Therapist              Exercises       03/01/18 1000       Subjective Comments    Subjective Comments  Patient reports   -LN     Subjective Pain    Able to rate subjective pain?  yes  -LN     Pre-Treatment Pain Level  5   5-6/10  -LN     Post-Treatment Pain Level  4  -LN     Subjective Pain Comment  He reports that he usually feels better and looser for the rest of the day after a PT session.   -LN     Exercise 1    Exercise Name 1 pendululms- HEP  -LN      Exercise 2    Exercise Name 2 cane exercise for shoulder ER   elbow at side  -LN      Cueing 2 Verbal  -LN      Reps 2 10  -LN      Time (Seconds) 2 5 seconds  -LN      Exercise 3    Exercise Name 3 cane exercise for flexion seated   only able to get to about 90 degrees  -LN      Cueing 3 Verbal  -LN      Reps 3 10  -LN      Time (Seconds) 3 5 seconds  -LN      Exercise 4    Exercise Name 4 active saws  -LN      Cueing 4 Verbal  -LN      Reps 4 15  -LN      Exercise 5    Exercise Name 5 cane exercise for scaption seated  -LN      Reps 5  10  -LN      Time (Seconds) 5 5 seconds  -LN        User Key  (r) = Recorded By, (t) = Taken By, (c) = Cosigned By    Initials Name Provider Type    LN Daphne Cates PT Physical Therapist                        Outcome Measure Options: Neck Disability Index (NDI)  Neck Disability Index  Section 1 - Pain Intensity: The pain is moderate at the moment.  Section 2 - Personal Care: I can look after myself normally, but it causes extra pain.  Section 3 - Lifting: I can lift only very light weights.  Section 4 - Work: I can't do my usual work.  Section 5 - Headaches: I have slight headaches that come infrequently.  Section 6 - Concentration: I have a fair degree of difficulty concentrating.  Section 7 - Sleeping: My sleep is moderately disturbed for up to 2-3 hours.  Section 8 - Driving: I can drive as long as I want with moderate neck pain.  Section 9 - Reading: I can read as much as I want with moderate neck pain.  Section 10 - Recreation: I can hardly do recreational activities due to neck pain.  Neck Disability Index Score: 24      Time Calculation:   Start Time: 1055  Stop Time: 1200  Time Calculation (min): 65 min     Therapy Charges for Today     Code Description Service Date Service Provider Modifiers Qty    60366719998  PT [facility] MAIN POS CURRENT 3/1/2018 Daphne Cates, PT GP, CK 1    92052927792 HC PT [facility] MAIN POS PROJECTED 3/1/2018 Daphne Cates, PT GP, CJ 1    12174940193  PT HOT OR COLD PACK TREAT [facility] 3/1/2018 Daphne Cates, PT GP 1    34036733015 HC PT MANUAL THERAPY EA 15 MIN 3/1/2018 Daphne Cates, PT GP 2    12001004007 HC PT THER PROC EA 15 MIN 3/1/2018 Daphne Cates, PT GP 1          PT G-Codes  PT Professional Judgement Used?: Yes  Outcome Measure Options: Neck Disability Index (NDI)  Functional Limitation: Changing and maintaining body position  Changing and Maintaining Body Position Current Status (): At least 40 percent but less than 60  percent impaired, limited or restricted  Changing and Maintaining Body Position Goal Status (): At least 20 percent but less than 40 percent impaired, limited or restricted         Daphne Cates, PT  3/1/2018

## 2018-03-06 ENCOUNTER — HOSPITAL ENCOUNTER (OUTPATIENT)
Dept: PHYSICAL THERAPY | Facility: HOSPITAL | Age: 73
Setting detail: THERAPIES SERIES
Discharge: HOME | End: 2018-03-06

## 2018-03-06 DIAGNOSIS — M25.511 CHRONIC RIGHT SHOULDER PAIN: Primary | ICD-10-CM

## 2018-03-06 DIAGNOSIS — M67.911 ROTATOR CUFF DYSFUNCTION, RIGHT: ICD-10-CM

## 2018-03-06 DIAGNOSIS — G89.29 CHRONIC RIGHT SHOULDER PAIN: Primary | ICD-10-CM

## 2018-03-06 PROCEDURE — 97110 THERAPEUTIC EXERCISES: CPT

## 2018-03-06 PROCEDURE — 97140 MANUAL THERAPY 1/> REGIONS: CPT

## 2018-03-06 NOTE — THERAPY TREATMENT NOTE
"    Outpatient Physical Therapy Ortho Treatment Note  ASHER Wiley     Patient Name: Roque Lepe  : 1945  MRN: [id number]  Today's Date: 3/6/2018      Visit Date: 2018    Visit Dx:    ICD-10-CM ICD-9-CM   1. Chronic right shoulder pain M25.511 719.41    G89.29 338.29   2. Rotator cuff dysfunction, right M67.911 726.10       There is no problem list on file for this patient.       Past Medical History:   Diagnosis Date   • Arthritis    • Coronary artery disease    • Diabetes mellitus    • HA (headache)    • Hypertension         Past Surgical History:   Procedure Laterality Date   • JOINT REPLACEMENT Bilateral     JENNIFER   • PACEMAKER IMPLANTATION               PT Ortho       18 1255    Subjective Comments    Subjective Comments Patient reports that he his hurting more in his neck and into right arm. \"It's usually worse on  than .\"   -LN    Precautions and Contraindications    Precautions/Limitations pacemaker  -LN    Precautions NO ES or US secondary to pacemaker  -LN    Subjective Pain    Able to rate subjective pain? yes  -LN    Pre-Treatment Pain Level 6  -LN    Post-Treatment Pain Level 4  -LN      User Key  (r) = Recorded By, (t) = Taken By, (c) = Cosigned By    Initials Name Provider Type    LN Daphne Cates, PT Physical Therapist                            PT Assessment/Plan       18 1300       PT Assessment    Assessment Comments Patient continues with moderate muscle tightness noted right UT and right deltoid and biceps muscle with tenderness. He is also having increased c/o N/T in right fingers with Right shoulder PROM and shoulder exercises- signs of possible nerve impingement possibly from neck or from brachial plexus- will continue to assess.  Fair tolerance to exercises today.   -LN     PT Plan    PT Frequency 2x/week  -LN     PT Plan Comments Continue per POC (right shoulder 2x/week and neck 1x/week).  Consider Kinesiotape to right upper arm next visit.  " " -LN       User Key  (r) = Recorded By, (t) = Taken By, (c) = Cosigned By    Initials Name Provider Type    ANTONIO Cates, PT Physical Therapist                Modalities       03/06/18 1255          Moist Heat    MH Applied Yes  -LN      Location Bilateral UT/shoulders and back with patient supine in hooklying.   with HOB elevated; and to left hip area  -LN      Rx Minutes 15 mins  -LN      MH S/P Rx Yes  -LN        User Key  (r) = Recorded By, (t) = Taken By, (c) = Cosigned By    Initials Name Provider Type    ANTONIO Cates, PT Physical Therapist                Exercises       03/06/18 1255          Subjective Comments    Subjective Comments Patient reports that he his hurting more in his neck and into right arm. \"It's usually worse on Tuesdays than Thursdays.\"   -LN      Subjective Pain    Able to rate subjective pain? yes  -LN      Pre-Treatment Pain Level 6  -LN      Post-Treatment Pain Level 4  -LN      Exercise 1    Exercise Name 1 pendululms- HEP  -LN      Exercise 2    Exercise Name 2 cane exercise for shoulder ER   elbow at side  -LN      Cueing 2 Verbal  -LN      Reps 2 10  -LN      Time (Seconds) 2 5 seconds  -LN      Exercise 3    Exercise Name 3 cane exercise for flexion seated   only able to get to about 90 degrees  -LN      Cueing 3 Verbal  -LN      Reps 3 10  -LN      Time (Seconds) 3 5 seconds  -LN      Exercise 4    Exercise Name 4 active saws  -LN      Cueing 4 Verbal  -LN      Reps 4 15  -LN      Exercise 5    Exercise Name 5 cane exercise for scaption seated  -LN      Reps 5 10  -LN      Time (Seconds) 5 5 seconds  -LN      Exercise 6    Exercise Name 6 active bicep curls  -LN      Cueing 6 Demo  -LN      Reps 6 10  -LN        User Key  (r) = Recorded By, (t) = Taken By, (c) = Cosigned By    Initials Name Provider Type    ANTONIO Cates, PT Physical Therapist                        Manual Rx (last 36 hours)      Manual Treatments       03/06/18 1258          " Manual Rx 1    Manual Rx 1 Location Right UT/shoulder/deltoid/biceps  -LN      Manual Rx 1 Type STM/MFR  -LN      Manual Rx 1 Duration 10 minutes  -LN      Manual Rx 8    Manual Rx 8 Location right shoulder AAROM/PROM   difficulty getting patient to relax during this  -LN      Manual Rx 8 Type for flexion & abduction/scaption, IR & ER  with patient seated.  -LN      Manual Rx 8 Grade gentle- to tolerance  -LN      Manual Rx 8 Duration 10 x each  -LN        User Key  (r) = Recorded By, (t) = Taken By, (c) = Cosigned By    Initials Name Provider Type    LN Daphne Cates, PT Physical Therapist              Therapy Education  Given: HEP, Symptoms/condition management, Pain management  Program: New (added active bicep curls)  How Provided: Verbal, Demonstration  Provided to: Patient  Level of Understanding: Teach back education performed, Verbalized, Demonstrated              Time Calculation:   Start Time: 1300  Stop Time: 1405  Time Calculation (min): 65 min    Therapy Charges for Today     Code Description Service Date Service Provider Modifiers Qty    36798428348 HC PT HOT OR COLD PACK TREAT [facility] 3/6/2018 Daphne Cates, PT GP 1    56138698689 HC PT MANUAL THERAPY EA 15 MIN 3/6/2018 Daphne Cates, PT GP 1    66407505799 HC PT THER PROC EA 15 MIN 3/6/2018 Daphne Catse, PT GP 1                    Daphne Cates, PT  3/6/2018

## 2018-03-08 ENCOUNTER — HOSPITAL ENCOUNTER (OUTPATIENT)
Dept: PHYSICAL THERAPY | Facility: HOSPITAL | Age: 73
Setting detail: THERAPIES SERIES
Discharge: HOME | End: 2018-03-08

## 2018-03-08 DIAGNOSIS — M67.911 ROTATOR CUFF DYSFUNCTION, RIGHT: ICD-10-CM

## 2018-03-08 DIAGNOSIS — G89.29 CHRONIC RIGHT SHOULDER PAIN: Primary | ICD-10-CM

## 2018-03-08 DIAGNOSIS — M25.511 CHRONIC RIGHT SHOULDER PAIN: Primary | ICD-10-CM

## 2018-03-08 DIAGNOSIS — M54.2 NECK PAIN: ICD-10-CM

## 2018-03-08 PROCEDURE — 97140 MANUAL THERAPY 1/> REGIONS: CPT

## 2018-03-08 NOTE — THERAPY TREATMENT NOTE
"    Outpatient Physical Therapy Ortho Treatment Note  ASHER Wiley     Patient Name: Roque Lepe  : 1945  MRN: [id number]  Today's Date: 3/8/2018      Visit Date: 2018    Visit Dx:    ICD-10-CM ICD-9-CM   1. Chronic right shoulder pain M25.511 719.41    G89.29 338.29   2. Rotator cuff dysfunction, right M67.911 726.10   3. Neck pain M54.2 723.1       There is no problem list on file for this patient.       Past Medical History:   Diagnosis Date   • Arthritis    • Coronary artery disease    • Diabetes mellitus    • HA (headache)    • Hypertension         Past Surgical History:   Procedure Laterality Date   • JOINT REPLACEMENT Bilateral     JENNIFER   • PACEMAKER IMPLANTATION               PT Ortho       18 1200    Subjective Comments    Subjective Comments Patient reports getting about 4-5 hours relief from PT. \"I woke up about 4:30 this am because my right shoulder and neck were hurting.\"  \"I feel like I haven't been here in a week.\"   -LN    Precautions and Contraindications    Precautions/Limitations pacemaker  -LN    Precautions NO ES or US secondary to pacemaker  -LN    Subjective Pain    Able to rate subjective pain? yes  -LN    Pre-Treatment Pain Level 6  -LN    Post-Treatment Pain Level 4  -LN    Neck    Flexion AROM Deficit WFL  -LN    Extension AROM Deficit 75%  -LN    Lt Lat Flexion AROM Deficit 75%  -LN    Rt Lateral Flexion AROM Deficit 75%   -LN    Lt Rotation AROM Deficit WFL  -LN    Rt Rotation AROM Deficit WFL  -LN      18 1255    Subjective Comments    Subjective Comments Patient reports that he his hurting more in his neck and into right arm. \"It's usually worse on  than .\"   -LN    Precautions and Contraindications    Precautions/Limitations pacemaker  -LN    Precautions NO ES or US secondary to pacemaker  -LN    Subjective Pain    Able to rate subjective pain? yes  -LN    Pre-Treatment Pain Level 6  -LN    Post-Treatment Pain Level 4  -LN      User Key  (r) = " "Recorded By, (t) = Taken By, (c) = Cosigned By    Initials Name Provider Type    ANTONIO Cates, KERLINE Physical Therapist                            PT Assessment/Plan       03/08/18 1300       PT Assessment    Assessment Comments Patient continues with c/o persistent pain and tightness in right neck and shoulder. Patient noted to have moderate muscle guarding right UT/MT and deltoid muscle,but only minimal noted in right biceps muscle. He is doing well with HEP. He tolerated PROM of right shoulder fairly well but still with occasional N/T in right hand.  Patient still with decreased use of right UE with ADLs, but can feed himself with right hand.   -LN     PT Plan    PT Frequency 2x/week  -LN     PT Plan Comments Continue per POC (right shoulder 2x/week and neck 1x/week). Assess benefit of Kinesiotape to right deltoid.   -LN       User Key  (r) = Recorded By, (t) = Taken By, (c) = Cosigned By    Initials Name Provider Type    ANTONIO Cates, PT Physical Therapist                Modalities       03/08/18 1200          Moist Heat    MH Applied Yes  -LN      Location Bilateral UT/shoulders and back with patient supine in hooklying.   with HOB elevated; and to left hip area  -LN      Rx Minutes 15 mins  -LN      MH S/P Rx Yes  -LN      Other Treatment Provided    Taping / Bracing Kinesiotape applied using 2 \"I\" strips for spatial correction at most tender spot right deltoid muscle. Patient to leave tape on up to 5 days and to trim/remove tape as needed. Reviewed use and care of tape, including safe removal of tape.   -LN        User Key  (r) = Recorded By, (t) = Taken By, (c) = Cosigned By    Initials Name Provider Type    ANTONIO Cates, PT Physical Therapist                Exercises       03/08/18 1200          Subjective Comments    Subjective Comments Patient reports getting about 4-5 hours relief from PT. \"I woke up about 4:30 this am because my right shoulder and neck were hurting.\"  " "\"I feel like I haven't been here in a week.\"   -LN      Subjective Pain    Able to rate subjective pain? yes  -LN      Pre-Treatment Pain Level 6  -LN      Post-Treatment Pain Level 4  -LN      Exercise 1    Exercise Name 1 Verbally reviewed HEP.  -LN      Exercise 2    Exercise Name 2 --  -LN      Cueing 2 --  -LN      Reps 2 --  -LN      Time (Seconds) 2 --  -LN      Exercise 3    Exercise Name 3 --  -LN      Cueing 3 --  -LN      Reps 3 --  -LN      Time (Seconds) 3 --  -LN      Exercise 4    Exercise Name 4 --  -LN      Cueing 4 --  -LN      Reps 4 --  -LN      Exercise 5    Exercise Name 5 --  -LN      Reps 5 --  -LN      Time (Seconds) 5 --  -LN      Exercise 6    Exercise Name 6 --  -LN      Cueing 6 --  -LN      Reps 6 --  -LN        User Key  (r) = Recorded By, (t) = Taken By, (c) = Cosigned By    Initials Name Provider Type    LN Daphne Cates, PT Physical Therapist                        Manual Rx (last 36 hours)      Manual Treatments       03/08/18 1100          Manual Rx 1    Manual Rx 1 Location Bilateral UT/levator/cervical PS/OA area/deltoid with patient seated in chair  -LN      Manual Rx 1 Type STM/trigger point massage with patient seated in chair  -LN      Manual Rx 1 Duration 10 minutes  -LN      Manual Rx 2    Manual Rx 2 Location gentle manual Cervical traction in sitting  -LN      Manual Rx 2 Duration 8 x 10 seconds  -LN      Manual Rx 3    Manual Rx 3 Location gentle passive SCM stretch on right  -LN      Manual Rx 3 Duration 3 x 10 seconds each  -LN      Manual Rx 4    Manual Rx 4 Location AA cervical SB   seated; with gentle passive UT stretch  -LN      Manual Rx 4 Grade gentle  -LN      Manual Rx 4 Duration 3 x each  -LN      Manual Rx 5    Manual Rx 5 Location gentle A-P mobs C7- T8 with patient seated.   at spinous processes and transverse processess  -LN      Manual Rx 5 Duration 2 minutes  -LN      Manual Rx 7    Manual Rx 7 Location right shoulder distraction  -LN      " Manual Rx 7 Grade gentle   -LN      Manual Rx 7 Duration 3 x 5 sec   had some discomfort with this  -LN      Manual Rx 8    Manual Rx 8 Location right shoulder AAROM/PROM   difficulty getting patient to relax during this  -LN      Manual Rx 8 Type for flexion & abduction/scaption seated, and IR & ER  with patient supine  -LN      Manual Rx 8 Grade gentle- to tolerance  -LN      Manual Rx 8 Duration 10 x each  -LN        User Key  (r) = Recorded By, (t) = Taken By, (c) = Cosigned By    Initials Name Provider Type    LN Daphne Cates, PT Physical Therapist              Therapy Education  Given: HEP, Symptoms/condition management, Pain management  Program: Reinforced  How Provided: Verbal, Demonstration  Provided to: Patient  Level of Understanding: Teach back education performed, Verbalized, Demonstrated              Time Calculation:   Start Time: 1255  Stop Time: 1400  Time Calculation (min): 65 min    Therapy Charges for Today     Code Description Service Date Service Provider Modifiers Qty    30426298626  PT HOT OR COLD PACK TREAT MCARE 3/8/2018 Daphne Cates, PT GP 1    [id number]  PT MANUAL THERAPY EA 15 MIN 3/8/2018 Daphne Cates, PT GP 2                    Daphne Cates, PT  3/8/2018

## 2018-03-13 ENCOUNTER — HOSPITAL ENCOUNTER (OUTPATIENT)
Dept: PHYSICAL THERAPY | Facility: HOSPITAL | Age: 73
Setting detail: THERAPIES SERIES
Discharge: HOME | End: 2018-03-13

## 2018-03-13 DIAGNOSIS — M67.911 ROTATOR CUFF DYSFUNCTION, RIGHT: ICD-10-CM

## 2018-03-13 DIAGNOSIS — G89.29 CHRONIC RIGHT SHOULDER PAIN: Primary | ICD-10-CM

## 2018-03-13 DIAGNOSIS — M25.511 CHRONIC RIGHT SHOULDER PAIN: Primary | ICD-10-CM

## 2018-03-13 PROCEDURE — 97140 MANUAL THERAPY 1/> REGIONS: CPT

## 2018-03-13 PROCEDURE — 97110 THERAPEUTIC EXERCISES: CPT

## 2018-03-13 NOTE — THERAPY TREATMENT NOTE
"    Outpatient Physical Therapy Ortho Treatment Note  ASHER RiceJackson     Patient Name: Roque Lepe  : 1945  MRN: [id number]  Today's Date: 3/13/2018      Visit Date: 2018    Visit Dx:    ICD-10-CM ICD-9-CM   1. Chronic right shoulder pain M25.511 719.41    G89.29 338.29   2. Rotator cuff dysfunction, right M67.911 726.10       There is no problem list on file for this patient.       Past Medical History:   Diagnosis Date   • Arthritis    • Coronary artery disease    • Diabetes mellitus    • HA (headache)    • Hypertension         Past Surgical History:   Procedure Laterality Date   • JOINT REPLACEMENT Bilateral     JENNIFER   • PACEMAKER IMPLANTATION               PT Ortho     Row Name 18 1300       Subjective Comments    Subjective Comments \"It just keeps coming back.\"  \"it's just sore and hurts.\"  He reports feeling better after treatments for about 5 days.  \" I think that I can use my arm better.\"   -LN       Precautions and Contraindications    Precautions/Limitations pacemaker  -LN    Precautions NO ES or US secondary to pacemaker  -LN       Subjective Pain    Able to rate subjective pain? yes  -LN    Pre-Treatment Pain Level 5  -LN    Post-Treatment Pain Level 4  -LN      User Key  (r) = Recorded By, (t) = Taken By, (c) = Cosigned By    Initials Name Provider Type    ANTONIO Cates, PT Physical Therapist                            PT Assessment/Plan     Row Name 18 1300          PT Assessment    Assessment Comments Patient continues with c/o pain in right UT and shoulder/upper arm area but overall decreased muscle guarding, but  to touch evaristo right deltoid area. Overall improved PROM noted right shoulder and improving functional use of right UE with ADLs. He still has weakness right shoulder and needs continued strengthening.  He is tolerating increased exercises with no c/o increased pain but c/o fatigue in right arm.   -LN        PT Plan    PT Frequency 2x/week  -LN  " "   PT Plan Comments Continue per POC (right shoulder 2x/week and neck 1x/week).  Progress exercises as tolerated. MH PRN.   -LN       User Key  (r) = Recorded By, (t) = Taken By, (c) = Cosigned By    Initials Name Provider Type    LN Daphne Cates, PT Physical Therapist                Modalities     Row Name 03/13/18 1300             Moist Heat    MH Applied Yes  -LN      Location Bilateral UT/shoulders and back with patient supine in hooklying.   with HOB elevated; and to left hip area  -LN      Rx Minutes Other:   20 minutes  -LN      MH S/P Rx Yes  -LN        User Key  (r) = Recorded By, (t) = Taken By, (c) = Cosigned By    Initials Name Provider Type    ANTONIO Cates, PT Physical Therapist                Exercises     Row Name 03/13/18 1300             Subjective Comments    Subjective Comments \"It just keeps coming back.\"  \"it's just sore and hurts.\"  He reports feeling better after treatments for about 5 days.  \" I think that I can use my arm better.\"   -LN         Subjective Pain    Able to rate subjective pain? yes  -LN      Pre-Treatment Pain Level 5  -LN      Post-Treatment Pain Level 4  -LN         Exercise 2    Exercise Name 2 cane exercise for shoulder ER   elbow at side  -LN      Cueing 2 Verbal  -LN      Reps 2 10  -LN      Time (Seconds) 2 5 seconds  -LN         Exercise 3    Exercise Name 3 cane exercise for flexion seated   only able to get to about 90 degrees  -LN      Cueing 3 Verbal  -LN      Reps 3 10  -LN      Time (Seconds) 3 5 seconds  -LN         Exercise 4    Exercise Name 4 active saws  -LN      Cueing 4 Verbal  -LN      Reps 4 10  -LN      Additional Comments 1#  -LN         Exercise 5    Exercise Name 5 cane exercise for scaption seated  -LN      Reps 5 10  -LN      Time (Seconds) 5 5 seconds  -LN         Exercise 6    Exercise Name 6 active bicep curls  -LN      Reps 6 10  -LN      Additional Comments 1#  -LN      Cueing 6 Demo  -LN        User Key  (r) = Recorded " By, (t) = Taken By, (c) = Cosigned By    Initials Name Provider Type    ANTONIO Cates PT Physical Therapist                        Manual Rx (last 36 hours)      Manual Treatments     Row Name 03/13/18 1300             Manual Rx 1    Manual Rx 1 Location Right UT/levator area/deltoid/biceps with patient seated in chair  -LN      Manual Rx 1 Type STM with patient seated in chair  -LN      Manual Rx 1 Duration 10 minutes  -LN         Manual Rx 8    Manual Rx 8 Location right shoulder AAROM/PROM   difficulty getting patient to relax during this  -LN      Manual Rx 8 Type for flexion & abduction/scaption, IR and ER seated,  -LN      Manual Rx 8 Grade gentle- to tolerance  -LN      Manual Rx 8 Duration 10 x each  -LN        User Key  (r) = Recorded By, (t) = Taken By, (c) = Cosigned By    Initials Name Provider Type    ANTONIO Cates PT Physical Therapist              Therapy Education  Education Details: Patient to look into buying a 1# weight or will use a 16 oz. soup can at home.   Given: HEP, Symptoms/condition management, Pain management  Program: Progressed (added 1 # to saws and bicep curls)  How Provided: Verbal, Demonstration  Provided to: Patient  Level of Understanding: Teach back education performed, Verbalized, Demonstrated              Time Calculation:   Start Time: 1300  Stop Time: 1405  Time Calculation (min): 65 min    Therapy Charges for Today     Code Description Service Date Service Provider Modifiers Qty    47629621062 HC PT HOT OR COLD PACK TREAT MCARE 3/13/2018 Daphne Cates, PT GP 1    57677888440 HC PT MANUAL THERAPY EA 15 MIN 3/13/2018 Daphne Cates, PT GP 2    48074072822 HC PT THER PROC EA 15 MIN 3/13/2018 Daphne Cates, PT GP 1                    Daphne Cates, PT  3/13/2018

## 2018-03-15 ENCOUNTER — HOSPITAL ENCOUNTER (OUTPATIENT)
Dept: PHYSICAL THERAPY | Facility: HOSPITAL | Age: 73
Setting detail: THERAPIES SERIES
Discharge: HOME | End: 2018-03-15

## 2018-03-15 DIAGNOSIS — M25.511 CHRONIC RIGHT SHOULDER PAIN: Primary | ICD-10-CM

## 2018-03-15 DIAGNOSIS — M54.2 NECK PAIN: ICD-10-CM

## 2018-03-15 DIAGNOSIS — G89.29 CHRONIC RIGHT SHOULDER PAIN: Primary | ICD-10-CM

## 2018-03-15 DIAGNOSIS — M67.911 ROTATOR CUFF DYSFUNCTION, RIGHT: ICD-10-CM

## 2018-03-15 PROCEDURE — 97140 MANUAL THERAPY 1/> REGIONS: CPT

## 2018-03-15 NOTE — THERAPY TREATMENT NOTE
"    Outpatient Physical Therapy Ortho Treatment Note  ASHER Wiley     Patient Name: Roque Lepe  : 1945  MRN: [id number]  Today's Date: 3/15/2018      Visit Date: 03/15/2018    Visit Dx:    ICD-10-CM ICD-9-CM   1. Chronic right shoulder pain M25.511 719.41    G89.29 338.29   2. Rotator cuff dysfunction, right M67.911 726.10   3. Neck pain M54.2 723.1       There is no problem list on file for this patient.       Past Medical History:   Diagnosis Date   • Arthritis    • Coronary artery disease    • Diabetes mellitus    • HA (headache)    • Hypertension         Past Surgical History:   Procedure Laterality Date   • JOINT REPLACEMENT Bilateral     JENNIFER   • PACEMAKER IMPLANTATION               PT Ortho     Row Name 03/15/18 1300       Right Upper Ext    Rt Shoulder Abduction AROM 125  -LN    Rt Shoulder Abduction PROM 160  -LN    Rt Shoulder Flexion AROM 145  -LN    Rt Shoulder Flexion PROM 170  -LN    Rt Shoulder External Rotation AROM --  -LN    Rt Shoulder Internal Rotation AROM 90   behind back to waist but very guarded  -LN    Rt Shoulder Internal Rotation PROM 90  -LN    Row Name 03/15/18 1200       Subjective Comments    Subjective Comments \"I'm sore.\" He reports that he hurts more first thing in the morning when he wakes up.   -LN       Precautions and Contraindications    Precautions/Limitations pacemaker  -LN    Precautions NO ES or US secondary to pacemaker  -LN       Subjective Pain    Able to rate subjective pain? yes  -LN    Pre-Treatment Pain Level 5  -LN    Post-Treatment Pain Level 4  -LN    Row Name 18 1300       Subjective Comments    Subjective Comments \"It just keeps coming back.\"  \"it's just sore and hurts.\"  He reports feeling better after treatments for about 5 days.  \" I think that I can use my arm better.\"   -LN       Precautions and Contraindications    Precautions/Limitations pacemaker  -LN    Precautions NO ES or US secondary to pacemaker  -LN       Subjective Pain    Able to " rate subjective pain? yes  -LN    Pre-Treatment Pain Level 5  -LN    Post-Treatment Pain Level 4  -LN      User Key  (r) = Recorded By, (t) = Taken By, (c) = Cosigned By    Initials Name Provider Type    ANTONIO Cates, PT Physical Therapist                            PT Assessment/Plan     Row Name 03/15/18 1300          PT Assessment    Assessment Comments Patient with persisitent pain in right UT/MT and into right shoulder/deltoid/biceps muscle and still with minimal to moderate muscle tightness noted with trigger point noted today right MT area- decreased after treatment.  He is doing well with HEP and right shoulder AROM is improved. He also reported discomfort in pronator teres muscle today evaristo with supination/pronation movements. He continues to c/o occasional N/T in right hand with use of right arm; unsure if this is coming from his neck or his shoulder.  Patient with improved right shoulder flexion.   -LN        PT Plan    PT Frequency 2x/week  -LN     PT Plan Comments Continue per POC (right shoulder 2x/week and neck 1x/week).  Progress exercises as tolerated. MH PRN.   -LN       User Key  (r) = Recorded By, (t) = Taken By, (c) = Cosigned By    Initials Name Provider Type    ANTONIO Cates, PT Physical Therapist                Modalities     Row Name 03/15/18 1300             Moist Heat    MH Applied Yes  -LN      Location Bilateral UT/shoulders and back with patient supine in hooklying.   with HOB elevated; and to left hip area  -LN      Rx Minutes Other:   20 minutes  -LN       S/P Rx Yes  -LN         Other Treatment Provided    Taping / Bracing No taping done secondary to patient reports that he can't tell if it helps or not.   -LN        User Key  (r) = Recorded By, (t) = Taken By, (c) = Cosigned By    Initials Name Provider Type    ANTONIO Cates, PT Physical Therapist                Exercises     Row Name 03/15/18 1200             Subjective Comments    Subjective  "Comments \"I'm sore.\" He reports that he hurts more first thing in the morning when he wakes up.   -LN         Subjective Pain    Able to rate subjective pain? yes  -LN      Pre-Treatment Pain Level 5  -LN      Post-Treatment Pain Level 4  -LN         Exercise 1    Exercise Name 1 Verbally reviewed HEP.  -LN        User Key  (r) = Recorded By, (t) = Taken By, (c) = Cosigned By    Initials Name Provider Type    LN Daphne Cates, PT Physical Therapist                        Manual Rx (last 36 hours)      Manual Treatments     Row Name 03/15/18 1300 03/15/18 1100          Manual Rx 1    Manual Rx 1 Location Bilateral UT/levator/cervical PS/OA area/deltoid with patient seated in chair  -LN --  -LN     Manual Rx 1 Type STM/trigger point massage with patient seated in chair  -LN --  -LN     Manual Rx 1 Duration 10 minutes  -LN --  -LN        Manual Rx 2    Manual Rx 2 Location gentle manual Cervical traction in sitting  -LN --  -LN     Manual Rx 2 Duration 8 x 10 seconds  -LN --  -LN        Manual Rx 3    Manual Rx 3 Location gentle passive SCM stretch on right  -LN --  -LN     Manual Rx 3 Duration 3 x 10 seconds each  -LN --  -LN        Manual Rx 4    Manual Rx 4 Location AA cervical SB   seated; with gentle passive UT stretch  -LN --  -LN     Manual Rx 4 Grade gentle  -LN --  -LN     Manual Rx 4 Duration 3 x each  -LN --  -LN        Manual Rx 5    Manual Rx 5 Location gentle A-P mobs C7- T8 with patient seated.   at spinous processes and transverse processess  -LN --  -LN     Manual Rx 5 Duration 2 minutes  -LN --  -LN        Manual Rx 7    Manual Rx 7 Location right shoulder distraction  -LN --  -LN     Manual Rx 7 Grade gentle   -LN --  -LN     Manual Rx 7 Duration 3 x 5 sec   had some discomfort with this  -LN --  -LN        Manual Rx 8    Manual Rx 8 Location right shoulder AAROM/PROM   difficulty getting patient to relax during this  -LN --  -LN     Manual Rx 8 Type for flexion & abduction/scaption seated, " and IR & ER  with patient supine  -LN --  -LN     Manual Rx 8 Grade gentle- to tolerance  -LN --  -LN     Manual Rx 8 Duration 10 x each  -LN --  -LN       User Key  (r) = Recorded By, (t) = Taken By, (c) = Cosigned By    Initials Name Provider Type    LN Daphne Cates, PT Physical Therapist              Therapy Education  Given: HEP, Symptoms/condition management, Pain management  Program: Reinforced  How Provided: Verbal  Provided to: Patient  Level of Understanding: Teach back education performed, Verbalized              Time Calculation:   Start Time: 1255  Stop Time: 1400  Time Calculation (min): 65 min    Therapy Charges for Today     Code Description Service Date Service Provider Modifiers Qty    16364468461 HC PT HOT OR COLD PACK TREAT MCARE 3/15/2018 Daphne Cates, PT GP 1    53986138781 HC PT MANUAL THERAPY EA 15 MIN 3/15/2018 Daphne Cates, PT GP 2                    Daphne Cates, PT  3/15/2018

## 2018-03-20 ENCOUNTER — HOSPITAL ENCOUNTER (OUTPATIENT)
Dept: PHYSICAL THERAPY | Facility: HOSPITAL | Age: 73
Setting detail: THERAPIES SERIES
Discharge: HOME | End: 2018-03-20

## 2018-03-20 DIAGNOSIS — M67.911 ROTATOR CUFF DYSFUNCTION, RIGHT: ICD-10-CM

## 2018-03-20 DIAGNOSIS — M25.511 CHRONIC RIGHT SHOULDER PAIN: Primary | ICD-10-CM

## 2018-03-20 DIAGNOSIS — G89.29 CHRONIC RIGHT SHOULDER PAIN: Primary | ICD-10-CM

## 2018-03-20 PROCEDURE — 97140 MANUAL THERAPY 1/> REGIONS: CPT

## 2018-03-20 PROCEDURE — 97110 THERAPEUTIC EXERCISES: CPT

## 2018-03-20 NOTE — THERAPY TREATMENT NOTE
"    Outpatient Physical Therapy Ortho Treatment Note  [facility] [place]     Patient Name: Roque Lepe  : 1945  MRN: [id number]  Today's Date: 3/20/2018      Visit Date: 2018    Visit Dx:    ICD-10-CM ICD-9-CM   1. Chronic right shoulder pain M25.511 719.41    G89.29 338.29   2. Rotator cuff dysfunction, right M67.911 726.10       There is no problem list on file for this patient.       Past Medical History:   Diagnosis Date   • Arthritis    • Coronary artery disease    • Diabetes mellitus    • HA (headache)    • Hypertension         Past Surgical History:   Procedure Laterality Date   • JOINT REPLACEMENT Bilateral     JENNIFER   • PACEMAKER IMPLANTATION               PT Ortho     Row Name 18 1300       Subjective Comments    Subjective Comments \"My shoulder still hurts but it may be a little better.\"  He continues to c/o tighness and soreness in UT area. \"I'm still having trouble sleeping because I can't lie on my side and I'm getting tired of sleeping on my back.\" He reports only getting about 4 hours sleep last night.   -LN       Precautions and Contraindications    Precautions/Limitations pacemaker  -LN    Precautions NO ES or US secondary to pacemaker  -LN       Subjective Pain    Able to rate subjective pain? yes  -LN    Pre-Treatment Pain Level 5  -LN    Post-Treatment Pain Level 4  -LN      User Key  (r) = Recorded By, (t) = Taken By, (c) = Cosigned By    Initials Name Provider Type    ANTONIO Cates, PT Physical Therapist                            PT Assessment/Plan     Row Name 18 1300          PT Assessment    Assessment Comments Patient continues with c/o soreness and tightness in right UT/MT/deltoid/biceps muscle but overall seems to be decreasing.  He tolerated shoulder exercises fairly well but right arm does fatigue quickly after exercises. Overall decreased muscle guarding to minimal right UT/MT/deltoid/biceps area.  Disturbed sleep continues.   -LN        PT Plan    PT " "Frequency 2x/week  -LN     PT Plan Comments Continue per POC (right shoulder 2x/week and neck 1x/week).  Progress exercises as tolerated. MH PRN.  Trial of Kinesiotape to right biceps muscle next visit.   -LN       User Key  (r) = Recorded By, (t) = Taken By, (c) = Cosigned By    Initials Name Provider Type    ANTONIO Cates, PT Physical Therapist                Modalities     Row Name 03/20/18 1300             Moist Heat    MH Applied Yes  -LN      Location Bilateral UT/shoulders and back with patient supine in hooklying.   with HOB elevated; and to left hip area  -LN      Rx Minutes Other:   20 minutes  -LN      MH S/P Rx Yes  -LN        User Key  (r) = Recorded By, (t) = Taken By, (c) = Cosigned By    Initials Name Provider Type    ANTONIO Cates, PT Physical Therapist                Exercises     Row Name 03/20/18 1300             Subjective Comments    Subjective Comments \"My shoulder still hurts but it may be a little better.\"  He continues to c/o tighness and soreness in UT area. \"I'm still having trouble sleeping because I can't lie on my side and I'm getting tired of sleeping on my back.\" He reports only getting about 4 hours sleep last night.   -LN         Subjective Pain    Able to rate subjective pain? yes  -LN      Pre-Treatment Pain Level 5  -LN      Post-Treatment Pain Level 4  -LN         Exercise 2    Exercise Name 2 cane exercise for shoulder ER   elbow at side  -LN      Cueing 2 Verbal  -LN      Reps 2 10  -LN      Time (Seconds) 2 5 seconds  -LN         Exercise 3    Exercise Name 3 cane exercise for flexion seated   only able to get to about 90 degrees  -LN      Cueing 3 Verbal  -LN      Reps 3 10  -LN      Time (Seconds) 3 5 seconds  -LN         Exercise 4    Exercise Name 4 active saws  -LN      Cueing 4 Verbal  -LN      Reps 4 15  -LN      Additional Comments 1#  -LN         Exercise 5    Exercise Name 5 cane exercise for scaption seated  -LN      Reps 5 10  -LN      " Time (Seconds) 5 5 seconds  -LN         Exercise 6    Exercise Name 6 active bicep curls  -LN      Reps 6 15  -LN      Additional Comments 1#  -LN      Cueing 6 Demo  -LN        User Key  (r) = Recorded By, (t) = Taken By, (c) = Cosigned By    Initials Name Provider Type    LN Daphne Cates, PT Physical Therapist                        Manual Rx (last 36 hours)      Manual Treatments     Row Name 03/20/18 1300             Manual Rx 1    Manual Rx 1 Location Right UT/MT/deltoid/biceps area with patient seated.   -LN      Manual Rx 1 Type STM/trigger point massage with patient seated in chair  -LN      Manual Rx 1 Duration 10 minutes  -LN         Manual Rx 7    Manual Rx 7 Location right shoulder distraction  -LN      Manual Rx 7 Grade gentle   -LN      Manual Rx 7 Duration 3 x 5 sec   had some discomfort with this  -LN         Manual Rx 8    Manual Rx 8 Location right shoulder AAROM/PROM   difficulty getting patient to relax during this  -LN      Manual Rx 8 Type for flexion & abduction/scaption seated, and IR & ER  with patient seated.   -LN      Manual Rx 8 Grade gentle- to tolerance  -LN      Manual Rx 8 Duration 10 x each  -LN        User Key  (r) = Recorded By, (t) = Taken By, (c) = Cosigned By    Initials Name Provider Type    LN Daphne Cates PT Physical Therapist              Therapy Education  Given: HEP, Symptoms/condition management, Pain management  Program: Progressed  How Provided: Verbal  Provided to: Patient  Level of Understanding: Teach back education performed, Verbalized, Demonstrated              Time Calculation:   Start Time: 1300  Stop Time: 1400  Time Calculation (min): 60 min    Therapy Charges for Today     Code Description Service Date Service Provider Modifiers Qty    35967321672  PT HOT OR COLD PACK TREAT MCARE 3/20/2018 Daphne Cates, PT GP 1    [id number]  PT MANUAL THERAPY EA 15 MIN 3/20/2018 Daphne Cates, PT GP 1    [id number]  PT THER  PROC EA 15 MIN 3/20/2018 Daphne Cates, PT GP 1                    Daphne Cates, PT  3/20/2018

## 2018-03-22 ENCOUNTER — HOSPITAL ENCOUNTER (OUTPATIENT)
Dept: PHYSICAL THERAPY | Facility: HOSPITAL | Age: 73
Setting detail: THERAPIES SERIES
Discharge: HOME | End: 2018-03-22

## 2018-03-22 PROCEDURE — 97110 THERAPEUTIC EXERCISES: CPT

## 2018-03-22 PROCEDURE — 97140 MANUAL THERAPY 1/> REGIONS: CPT

## 2018-03-27 ENCOUNTER — HOSPITAL ENCOUNTER (OUTPATIENT)
Dept: PHYSICAL THERAPY | Facility: HOSPITAL | Age: 73
Setting detail: THERAPIES SERIES
Discharge: HOME | End: 2018-03-27

## 2018-03-27 DIAGNOSIS — M25.511 CHRONIC RIGHT SHOULDER PAIN: Primary | ICD-10-CM

## 2018-03-27 DIAGNOSIS — G89.29 CHRONIC RIGHT SHOULDER PAIN: Primary | ICD-10-CM

## 2018-03-27 DIAGNOSIS — M67.911 ROTATOR CUFF DYSFUNCTION, RIGHT: ICD-10-CM

## 2018-03-27 PROCEDURE — 97140 MANUAL THERAPY 1/> REGIONS: CPT

## 2018-03-27 PROCEDURE — 97110 THERAPEUTIC EXERCISES: CPT

## 2018-03-27 NOTE — THERAPY RE-EVALUATION
Outpatient Physical Therapy Ortho Re-Evaluation  [facility]     Patient Name: Roque Lepe  : 1945  MRN: [id number]  Today's Date: 3/27/2018      Visit Date: 2018    There is no problem list on file for this patient.       Past Medical History:   Diagnosis Date   • Arthritis    • Coronary artery disease    • Diabetes mellitus    • HA (headache)    • Hypertension         Past Surgical History:   Procedure Laterality Date   • JOINT REPLACEMENT Bilateral     JENNIFER   • PACEMAKER IMPLANTATION         Visit Dx:     ICD-10-CM ICD-9-CM   1. Chronic right shoulder pain M25.511 719.41    G89.29 338.29   2. Rotator cuff dysfunction, right M67.911 726.10                 PT Ortho     Row Name 18 1300       Precautions and Contraindications    Precautions/Limitations pacemaker  -LN    Precautions NO ES or US secondary to pacemaker  -LN       Right Upper Ext    Rt Shoulder Abduction AROM 105  -LN    Rt Shoulder Abduction PROM 140  -LN    Rt Shoulder Flexion AROM 128  -LN    Rt Shoulder Flexion PROM 170  -LN    Rt Shoulder External Rotation AROM 50  -LN    Rt Shoulder External Rotation PROM 60  -LN    Rt Shoulder Internal Rotation AROM 90  -LN    Rt Shoulder Internal Rotation PROM 90  -LN       Right Shoulder (Manual Muscle Testing)    MMT: Flexion, Right Shoulder flexion  -LN    MMT, Gross Movement: Right Shoulder Flexion (4-/5) good minus  -LN    MMT: Extension, Right Shoulder extension  -LN    MMT, Gross Movement: Right Shoulder Extension (4/5) good  -LN    MMT: ABduction, Right Shoulder abduction  -LN    MMT, Gross Movement: Right Shoulder ABduction (4/5) good  -LN    MMT: Internal Rotation, Right Shoulder internal rotation  -LN    MMT, Gross Movement: Right Shoulder Internal Rotation (4/5) good  -LN    MMT: External Rotation, Right Shoulder external rotation  -LN    MMT, Gross Movement: Right Shoulder External Rotation (4-/5) good minus  -LN    Comment, MMT: Right Shoulder adduction 4/5  -LN      User  Key  (r) = Recorded By, (t) = Taken By, (c) = Cosigned By    Initials Name Provider Type    LN Daphne Cates, PT Physical Therapist                      Therapy Education  Education Details: Patient to continue with HEP as tolerated and try and use right UE as much as he can without increasing symptoms.   Given: HEP, Symptoms/condition management, Pain management  Program: Reinforced  How Provided: Verbal, Demonstration  Provided to: Patient  Level of Understanding: Teach back education performed, Verbalized, Demonstrated           PT OP Goals     Row Name 03/27/18 1300          PT Short Term Goals    STG Date to Achieve 04/10/18  -LN     STG 1 Patient to verbally report decreased right shoulder pain to <5/10 with ADLs and everyday activities.   -LN     STG 1 Progress Ongoing  -LN     STG 1 Progress Comments at 5/10 today  -LN     STG 2 Patient to have improved right shoulder AROM to 150 degrees flexion, 130 degrees abduction, 90 degrees IR, and 50 degrees ER to improve functional use of right UE with ADLs.   -LN     STG 2 Progress Partially Met  -LN     STG 2 Progress Comments met for shoulder IR/ER  -LN     STG 3 Patient to have improved right shoulder strength by 1/2 muscle grade.   -LN     STG 3 Progress Partially Met  -LN     STG 3 Progress Comments ER improved by 1 muscle grade.   -LN     STG 4 Patient able to perform 10-15 rep of shoulder exercises without c/o increased shoulder pain.   -LN     STG 4 Progress Partially Met  -LN     STG 4 Progress Comments Patient able to perform 10 reps of shoulder exercises but with increased soreness reported.   -LN        Long Term Goals    LTG Date to Achieve 04/24/18  -LN     LTG 1 Patient to verbally report decreased right shoulder pain to 1-2/10 with ADLs and everyday activities.   -LN     LTG 1 Progress Ongoing  -LN     LTG 2 Patient independent with HEP issued by therapist.   -LN     LTG 2 Progress Ongoing;Progressing  -LN     LTG 3 Right shoulder AROM WFL  to allow for good functional use of right UE with ADLs.  -LN     LTG 3 Progress Ongoing  -LN     LTG 4 Nominal tenderness right shoulder and deltoid.   -LN     LTG 4 Progress Ongoing  -LN     LTG 4 Progress Comments Still with minimal to moderate tenderness.  -LN     LTG 5 Increase right  strength to 60#.   -LN     LTG 5 Progress Ongoing  -LN     LTG 5 Progress Comments Right  40# today; increased by 1# but still decreased.  -LN     LTG 6 Patient to have increased right shoulder strength to 4-4+/5.   -LN     LTG 6 Progress Partially Met  -LN     LTG 6 Progress Comments right shoulder strength 4- to 4/5.   -LN        Time Calculation    PT Goal Re-Cert Due Date 04/24/18  -LN       User Key  (r) = Recorded By, (t) = Taken By, (c) = Cosigned By    Initials Name Provider Type    ANTONIO Cates, PT Physical Therapist                PT Assessment/Plan     Row Name 03/27/18 1500          PT Assessment    Assessment Comments Patient continues with c/o pain right UT/deltoid/shoulder/biceps area and still with minimal to moderate tenderness noted.  Patient still with limited right shoulder AROM and decreased shoulder strength which affects his functional use of right UE with ADLs. Patient still with disturbed sleep reported. He has been able to make some progress with his exercises, but feel his overall progress with PT is plateauing.  He has partially met 3 STG and 1 LTG and is making progress towards remaining goals.  Do feel patient would benefit from further testing/CT scan to check for a RCT secondary to showing signs of possible RCT.   -LN        PT Plan    PT Frequency 2x/week  -LN     PT Plan Comments Continue per POC (right shoulder 2x/week and neck 1x/week).  Progress exercises as tolerated. MH PRN.   Continue towards all remaining goals.   -LN       User Key  (r) = Recorded By, (t) = Taken By, (c) = Cosigned By    Initials Name Provider Type    ANTONIO Cates, PT Physical Therapist     "            Modalities     Row Name 03/27/18 1300             Moist Heat     Applied Yes  -LN      Location Bilateral UT/shoulders and back with patient supine in hooklying.   with HOB elevated; and to left hip area  -LN      Rx Minutes Other:   20 minutes  -LN       S/P Rx Yes  -LN        User Key  (r) = Recorded By, (t) = Taken By, (c) = Cosigned By    Initials Name Provider Type    ANTONIO Cates, PT Physical Therapist              Exercises     Row Name 03/27/18 1300             Subjective Comments    Subjective Comments \"I'm about the same.\" He still reports occasional N/T right hand. \"I tried to lift an iron skillet the other day and I couldn't because it hurt too much.\"   -LN         Subjective Pain    Able to rate subjective pain? yes  -LN      Pre-Treatment Pain Level 5  -LN      Post-Treatment Pain Level 4  -LN         Exercise 2    Exercise Name 2 cane exercise for shoulder ER   elbow at side  -LN      Cueing 2 Verbal  -LN      Reps 2 10  -LN      Time (Seconds) 2 5 seconds  -LN         Exercise 3    Exercise Name 3 cane exercise for flexion seated   only able to get to about 90 degrees  -LN      Cueing 3 Verbal  -LN      Reps 3 10  -LN      Time (Seconds) 3 5 seconds  -LN         Exercise 4    Exercise Name 4 active saws  -LN      Cueing 4 Verbal  -LN      Reps 4 15  -LN      Additional Comments 1#  -LN         Exercise 5    Exercise Name 5 cane exercise for scaption seated  -LN      Reps 5 10  -LN      Time (Seconds) 5 5 seconds  -LN         Exercise 6    Exercise Name 6 active bicep curls  -LN      Reps 6 15  -LN      Additional Comments 1#  -LN      Cueing 6 Demo  -LN         Exercise 7    Exercise Name 7 active shoulder ER/IR with elbow at side  -LN      Cueing 7 Verbal;Demo  -LN      Reps 7 10  -LN      Additional Comments 1#  -LN        User Key  (r) = Recorded By, (t) = Taken By, (c) = Cosigned By    Initials Name Provider Type    LN Daphne Cates, PT Physical Therapist    "        Manual Rx (last 36 hours)      Manual Treatments     Row Name 03/27/18 1300             Manual Rx 1    Manual Rx 1 Location Bilateral UT/levator/cervical PS/OA area/deltoid with patient seated in chair  -LN      Manual Rx 1 Type STM/trigger point massage with patient seated in chair  -LN      Manual Rx 1 Duration 10 minutes  -LN         Manual Rx 8    Manual Rx 8 Location right shoulder AAROM/PROM   difficulty getting patient to relax during this  -LN      Manual Rx 8 Type for flexion & abduction/scaption seated, and IR & ER  with patient supine  -LN      Manual Rx 8 Grade gentle- to tolerance  -LN      Manual Rx 8 Duration 10 x each  -LN        User Key  (r) = Recorded By, (t) = Taken By, (c) = Cosigned By    Initials Name Provider Type    LN Daphne Cates, PT Physical Therapist                      Outcome Measure Options: Quick DASH  Quick DASH  Open a tight or new jar.: Moderate Difficulty  Do heavy household chores (e.g., wash walls, wash floors): Severe Difficulty  Carry a shopping bag or briefcase: Mild Difficulty  Wash your back: Severe Difficulty  Use a knife to cut food: No Difficulty  Recreational activities in which you take some force or impact through your arm, should or hand (e.g. golf, hammering, tennis, etc.): Unable  During the past week, to what extent has your arm, shoulder, or hand problem interfered with your normal social activites with family, friends, neighbors or groups?: Moderately  During the past week, were you limited in your work or other regular daily activities as a result of your arm, shoulder or hand problem?: Moderately Limited  Arm, Shoulder, or hand pain: Moderate  Tingling (pins and needles) in your arm, shoulder, or hand: Mild  During the past week, how much difficulty have you had sleeping because of the pain in your arm, shoulder or hand?: Moderate Difficiculty  Number of Questions Answered: 11  Quick DASH Score: 50         Time Calculation:   Start Time:  1300  Stop Time: 1400  Time Calculation (min): 60 min     Therapy Charges for Today     Code Description Service Date Service Provider Modifiers Qty    87575461430 HC PT HOT OR COLD PACK TREAT MCARE 3/27/2018 Daphne Cates, PT GP 1    07332250900 HC PT MANUAL THERAPY EA 15 MIN 3/27/2018 Daphne Cates, PT GP 1    22029359048 HC PT THER PROC EA 15 MIN 3/27/2018 Daphne Cates, PT GP 1          PT G-Codes  Outcome Measure Options: Quick DASH         Daphne Cates, PT  3/27/2018

## 2018-03-29 ENCOUNTER — HOSPITAL ENCOUNTER (OUTPATIENT)
Dept: PHYSICAL THERAPY | Facility: HOSPITAL | Age: 73
Setting detail: THERAPIES SERIES
Discharge: HOME | End: 2018-03-29

## 2018-03-29 DIAGNOSIS — M67.911 ROTATOR CUFF DYSFUNCTION, RIGHT: ICD-10-CM

## 2018-03-29 DIAGNOSIS — M54.2 NECK PAIN: ICD-10-CM

## 2018-03-29 DIAGNOSIS — M15.9 OSTEOARTHROSIS, GENERALIZED, INVOLVING MULTIPLE SITES: ICD-10-CM

## 2018-03-29 DIAGNOSIS — M25.50 ARTHRALGIA, UNSPECIFIED JOINT: ICD-10-CM

## 2018-03-29 DIAGNOSIS — G89.29 CHRONIC RIGHT SHOULDER PAIN: Primary | ICD-10-CM

## 2018-03-29 DIAGNOSIS — M25.511 CHRONIC RIGHT SHOULDER PAIN: Primary | ICD-10-CM

## 2018-03-29 PROCEDURE — 97140 MANUAL THERAPY 1/> REGIONS: CPT

## 2018-03-30 NOTE — THERAPY TREATMENT NOTE
"    Outpatient Physical Therapy Ortho Treatment Note  [facility]     Patient Name: Roque Lepe  : 1945  MRN: [id number]  Today's Date: 3/30/2018      Visit Date: 2018    Visit Dx:    ICD-10-CM ICD-9-CM   1. Chronic right shoulder pain M25.511 719.41    G89.29 338.29   2. Rotator cuff dysfunction, right M67.911 726.10   3. Neck pain M54.2 723.1   4. Arthralgia, unspecified joint M25.50 719.40   5. Osteoarthrosis, generalized, involving multiple sites M15.9 715.09       There is no problem list on file for this patient.       Past Medical History:   Diagnosis Date   • Arthritis    • Coronary artery disease    • Diabetes mellitus    • HA (headache)    • Hypertension         Past Surgical History:   Procedure Laterality Date   • JOINT REPLACEMENT Bilateral     JENNIFER   • PACEMAKER IMPLANTATION               PT Ortho     Row Name 18 1200       Subjective Comments    Subjective Comments 'I'm the same\"  Patient reports pain in right side of neck and into UT area/right shoulder/deltoid/biceps area.   -LN       Precautions and Contraindications    Precautions/Limitations pacemaker  -LN    Precautions NO ES or US secondary to pacemaker  -LN       Subjective Pain    Able to rate subjective pain? yes  -LN    Pre-Treatment Pain Level 5  -LN    Post-Treatment Pain Level 4  -LN    Subjective Pain Comment He does report always feeling better for rest of day after PT treatment.   -LN       Head/Neck/Trunk    Neck Extension AROM 75%  -LN    Neck Flexion AROM WFL  -LN    Neck Lt Lateral Flexion AROM 75%  -LN    Neck Rt Lateral Flexion AROM 75%  -LN    Neck Lt Rotation AROM WFL  -LN    Neck Rt Rotation AROM 75%  -LN    Row Name 18 1300       Precautions and Contraindications    Precautions/Limitations pacemaker  -LN    Precautions NO ES or US secondary to pacemaker  -LN       Right Upper Ext    Rt Shoulder Abduction AROM 105  -LN    Rt Shoulder Abduction PROM 140  -LN    Rt Shoulder Flexion AROM 128  -LN    Rt " Shoulder Flexion PROM 170  -LN    Rt Shoulder External Rotation AROM 50  -LN    Rt Shoulder External Rotation PROM 60  -LN    Rt Shoulder Internal Rotation AROM 90  -LN    Rt Shoulder Internal Rotation PROM 90  -LN       Right Shoulder (Manual Muscle Testing)    MMT: Flexion, Right Shoulder flexion  -LN    MMT, Gross Movement: Right Shoulder Flexion (4-/5) good minus  -LN    MMT: Extension, Right Shoulder extension  -LN    MMT, Gross Movement: Right Shoulder Extension (4/5) good  -LN    MMT: ABduction, Right Shoulder abduction  -LN    MMT, Gross Movement: Right Shoulder ABduction (4/5) good  -LN    MMT: Internal Rotation, Right Shoulder internal rotation  -LN    MMT, Gross Movement: Right Shoulder Internal Rotation (4/5) good  -LN    MMT: External Rotation, Right Shoulder external rotation  -LN    MMT, Gross Movement: Right Shoulder External Rotation (4-/5) good minus  -LN    Comment, MMT: Right Shoulder adduction 4/5  -LN      User Key  (r) = Recorded By, (t) = Taken By, (c) = Cosigned By    Initials Name Provider Type    ANTONIO Cates, PT Physical Therapist                            PT Assessment/Plan     Row Name 03/29/18 1300          PT Assessment    Assessment Comments Patient continues to c/o persistent pain in right neck/UT/shoulder/arm with minimal muscle guarding noted. Patient is still very tender right UT/deltoid/biceps/supraspinatus areas. Improved CROM and now 75%-WFL.  It is very difficult to get patient to voice any improvements, except that he does always report feeling better for awhile after treatments.   -LN        PT Plan    PT Frequency 2x/week  -LN     PT Plan Comments Decrease to 1 x week.  Progress exercises as tolerated. MH PRN.   Encouraged patient to call Ortho. MD about getting follow up appointment on shoulder and have CT scan done.   -LN       User Key  (r) = Recorded By, (t) = Taken By, (c) = Cosigned By    Initials Name Provider Type    ANTONIO Cates, PT  "Physical Therapist                Modalities     Row Name 03/29/18 1300             Moist Heat    MH Applied Yes  -LN      Location Bilateral UT/shoulders and back with patient supine in hooklying.   with HOB elevated; and to left hip area  -LN      Rx Minutes Other:   20 minutes  -LN      MH S/P Rx Yes  -LN        User Key  (r) = Recorded By, (t) = Taken By, (c) = Cosigned By    Initials Name Provider Type    LN Daphne Cates, PT Physical Therapist                Exercises     Row Name 03/29/18 1200             Subjective Comments    Subjective Comments 'I'm the same\"  Patient reports pain in right side of neck and into UT area/right shoulder/deltoid/biceps area.   -LN         Subjective Pain    Able to rate subjective pain? yes  -LN      Pre-Treatment Pain Level 5  -LN      Post-Treatment Pain Level 4  -LN      Subjective Pain Comment He does report always feeling better for rest of day after PT treatment.   -LN        User Key  (r) = Recorded By, (t) = Taken By, (c) = Cosigned By    Initials Name Provider Type    LN Daphne Cates, PT Physical Therapist                        Manual Rx (last 36 hours)      Manual Treatments     Row Name 03/29/18 1100             Manual Rx 1    Manual Rx 1 Location Bilateral UT/levator/cervical PS/OA area/deltoid with patient seated in chair  -LN      Manual Rx 1 Type STM/trigger point massage with patient seated in chair  -LN      Manual Rx 1 Duration 10 minutes  -LN         Manual Rx 2    Manual Rx 2 Location gentle manual Cervical traction in sitting  -LN      Manual Rx 2 Duration 8 x 10 seconds  -LN         Manual Rx 3    Manual Rx 3 Location gentle passive SCM stretch on right  -LN      Manual Rx 3 Duration 3 x 10 seconds each  -LN         Manual Rx 4    Manual Rx 4 Location AA cervical SB   seated; with gentle passive UT stretch  -LN      Manual Rx 4 Grade gentle  -LN      Manual Rx 4 Duration 3 x each  -LN         Manual Rx 5    Manual Rx 5 Location gentle " A-P mobs C7- T8 with patient seated.   at spinous processes and transverse processess  -LN      Manual Rx 5 Duration 2 minutes  -LN         Manual Rx 7    Manual Rx 7 Location right shoulder distraction  -LN      Manual Rx 7 Grade gentle   -LN      Manual Rx 7 Duration 3 x 5 sec   had some discomfort with this  -LN         Manual Rx 8    Manual Rx 8 Location right shoulder AAROM/PROM   difficulty getting patient to relax during this  -LN      Manual Rx 8 Type for flexion & abduction/scaption seated, and IR & ER  with patient supine  -LN      Manual Rx 8 Grade gentle- to tolerance  -LN      Manual Rx 8 Duration 10 x each  -LN        User Key  (r) = Recorded By, (t) = Taken By, (c) = Cosigned By    Initials Name Provider Type    LN Daphne Cates, PT Physical Therapist              Therapy Education  Education Details: Issued patient yellow putty for home to work on his  strength- did caution patient to not overdo it but to work with it 2-3 x day.   Given: HEP, Symptoms/condition management, Pain management  Program: Reinforced  How Provided: Verbal  Provided to: Patient  Level of Understanding: Teach back education performed, Verbalized              Time Calculation:   Start Time: 1300  Stop Time: 1400  Time Calculation (min): 60 min    Therapy Charges for Today     Code Description Service Date Service Provider Modifiers Qty    11735953185 HC PT HOT OR COLD PACK TREAT MCARE 3/29/2018 Daphne Cates, PT GP 1    33077356540 HC PT MANUAL THERAPY EA 15 MIN 3/29/2018 Daphne Cates, PT GP 2                    Daphne Cates, PT  3/30/2018

## 2018-03-30 NOTE — THERAPY TREATMENT NOTE
"    Outpatient Physical Therapy Ortho Treatment Note  ASHER Wiley     Patient Name: Roque Lepe  : 1945  MRN: [id number]  Today's Date: 3/30/2018      Visit Date: 2018    Visit Dx:    ICD-10-CM ICD-9-CM   1. Chronic right shoulder pain M25.511 719.41    G89.29 338.29   2. Rotator cuff dysfunction, right M67.911 726.10   3. Neck pain M54.2 723.1   4. Arthralgia, unspecified joint M25.50 719.40   5. Osteoarthrosis, generalized, involving multiple sites M15.9 715.09       There is no problem list on file for this patient.       Past Medical History:   Diagnosis Date   • Arthritis    • Coronary artery disease    • Diabetes mellitus    • HA (headache)    • Hypertension         Past Surgical History:   Procedure Laterality Date   • JOINT REPLACEMENT Bilateral     JENNIFER   • PACEMAKER IMPLANTATION               PT Ortho     Row Name 18 1200       Subjective Comments    Subjective Comments 'I'm the same\"  Patient reports pain in right side of neck and into UT area/right shoulder/deltoid/biceps area.   -LN       Precautions and Contraindications    Precautions/Limitations pacemaker  -LN    Precautions NO ES or US secondary to pacemaker  -LN       Subjective Pain    Able to rate subjective pain? yes  -LN    Pre-Treatment Pain Level 5  -LN    Post-Treatment Pain Level 4  -LN    Subjective Pain Comment He does report always feeling better for rest of day after PT treatment.   -LN       Head/Neck/Trunk    Neck Extension AROM 75%  -LN    Neck Flexion AROM WFL  -LN    Neck Lt Lateral Flexion AROM 75%  -LN    Neck Rt Lateral Flexion AROM 75%  -LN    Neck Lt Rotation AROM WFL  -LN    Neck Rt Rotation AROM 75%  -LN    Row Name 18 1300       Precautions and Contraindications    Precautions/Limitations pacemaker  -LN    Precautions NO ES or US secondary to pacemaker  -LN       Right Upper Ext    Rt Shoulder Abduction AROM 105  -LN    Rt Shoulder Abduction PROM 140  -LN    Rt Shoulder Flexion AROM 128  -LN    Rt " Shoulder Flexion PROM 170  -LN    Rt Shoulder External Rotation AROM 50  -LN    Rt Shoulder External Rotation PROM 60  -LN    Rt Shoulder Internal Rotation AROM 90  -LN    Rt Shoulder Internal Rotation PROM 90  -LN       Right Shoulder (Manual Muscle Testing)    MMT: Flexion, Right Shoulder flexion  -LN    MMT, Gross Movement: Right Shoulder Flexion (4-/5) good minus  -LN    MMT: Extension, Right Shoulder extension  -LN    MMT, Gross Movement: Right Shoulder Extension (4/5) good  -LN    MMT: ABduction, Right Shoulder abduction  -LN    MMT, Gross Movement: Right Shoulder ABduction (4/5) good  -LN    MMT: Internal Rotation, Right Shoulder internal rotation  -LN    MMT, Gross Movement: Right Shoulder Internal Rotation (4/5) good  -LN    MMT: External Rotation, Right Shoulder external rotation  -LN    MMT, Gross Movement: Right Shoulder External Rotation (4-/5) good minus  -LN    Comment, MMT: Right Shoulder adduction 4/5  -LN      User Key  (r) = Recorded By, (t) = Taken By, (c) = Cosigned By    Initials Name Provider Type    ANTONIO Cates, PT Physical Therapist                            PT Assessment/Plan     Row Name 03/29/18 1300          PT Assessment    Assessment Comments Patient continues to c/o persistent pain in right neck/UT/shoulder/arm with minimal muscle guarding noted. Patient is still very tender right UT/deltoid/biceps/supraspinatus areas. Improved CROM and now 75%-WFL.  It is very difficult to get patient to voice any improvements, except that he does always report feeling better for awhile after treatments.   -LN        PT Plan    PT Frequency 2x/week  -LN     PT Plan Comments Decrease to 1 x week.  Progress exercises as tolerated. MH PRN.   Encouraged patient to call Ortho. MD about getting follow up appointment on shoulder and have CT scan done.   -LN       User Key  (r) = Recorded By, (t) = Taken By, (c) = Cosigned By    Initials Name Provider Type    ANTONIO Cates, PT  "Physical Therapist                Modalities     Row Name 03/29/18 1300             Moist Heat    MH Applied Yes  -LN      Location Bilateral UT/shoulders and back with patient supine in hooklying.   with HOB elevated; and to left hip area  -LN      Rx Minutes Other:   20 minutes  -LN      MH S/P Rx Yes  -LN        User Key  (r) = Recorded By, (t) = Taken By, (c) = Cosigned By    Initials Name Provider Type    LN Daphne Cates, PT Physical Therapist                Exercises     Row Name 03/29/18 1200             Subjective Comments    Subjective Comments 'I'm the same\"  Patient reports pain in right side of neck and into UT area/right shoulder/deltoid/biceps area.   -LN         Subjective Pain    Able to rate subjective pain? yes  -LN      Pre-Treatment Pain Level 5  -LN      Post-Treatment Pain Level 4  -LN      Subjective Pain Comment He does report always feeling better for rest of day after PT treatment.   -LN        User Key  (r) = Recorded By, (t) = Taken By, (c) = Cosigned By    Initials Name Provider Type    LN Daphne Cates, PT Physical Therapist                        Manual Rx (last 36 hours)      Manual Treatments     Row Name 03/29/18 1100             Manual Rx 1    Manual Rx 1 Location Bilateral UT/levator/cervical PS/OA area/deltoid with patient seated in chair  -LN      Manual Rx 1 Type STM/trigger point massage with patient seated in chair  -LN      Manual Rx 1 Duration 10 minutes  -LN         Manual Rx 2    Manual Rx 2 Location gentle manual Cervical traction in sitting  -LN      Manual Rx 2 Duration 8 x 10 seconds  -LN         Manual Rx 3    Manual Rx 3 Location gentle passive SCM stretch on right  -LN      Manual Rx 3 Duration 3 x 10 seconds each  -LN         Manual Rx 4    Manual Rx 4 Location AA cervical SB   seated; with gentle passive UT stretch  -LN      Manual Rx 4 Grade gentle  -LN      Manual Rx 4 Duration 3 x each  -LN         Manual Rx 5    Manual Rx 5 Location gentle " A-P mobs C7- T8 with patient seated.   at spinous processes and transverse processess  -LN      Manual Rx 5 Duration 2 minutes  -LN         Manual Rx 7    Manual Rx 7 Location right shoulder distraction  -LN      Manual Rx 7 Grade gentle   -LN      Manual Rx 7 Duration 3 x 5 sec   had some discomfort with this  -LN         Manual Rx 8    Manual Rx 8 Location right shoulder AAROM/PROM   difficulty getting patient to relax during this  -LN      Manual Rx 8 Type for flexion & abduction/scaption seated, and IR & ER  with patient supine  -LN      Manual Rx 8 Grade gentle- to tolerance  -LN      Manual Rx 8 Duration 10 x each  -LN        User Key  (r) = Recorded By, (t) = Taken By, (c) = Cosigned By    Initials Name Provider Type    LN Daphne Cates, PT Physical Therapist              Therapy Education  Given: HEP, Symptoms/condition management, Pain management  Program: Reinforced  How Provided: Verbal  Provided to: Patient  Level of Understanding: Teach back education performed, Verbalized              Time Calculation:   Start Time: 1300  Stop Time: 1400  Time Calculation (min): 60 min    Therapy Charges for Today     Code Description Service Date Service Provider Modifiers Qty    38436551441 HC PT HOT OR COLD PACK TREAT MCARE 3/29/2018 Daphne Cates, PT GP 1    11153567988 HC PT MANUAL THERAPY EA 15 MIN 3/29/2018 Daphne Cates, PT GP 2                    Daphne Cates, PT  3/30/2018

## 2018-04-03 ENCOUNTER — HOSPITAL ENCOUNTER (OUTPATIENT)
Dept: PHYSICAL THERAPY | Facility: HOSPITAL | Age: 73
Setting detail: THERAPIES SERIES
Discharge: HOME | End: 2018-04-03

## 2018-04-03 DIAGNOSIS — G89.29 CHRONIC RIGHT SHOULDER PAIN: ICD-10-CM

## 2018-04-03 DIAGNOSIS — M67.911 ROTATOR CUFF DYSFUNCTION, RIGHT: ICD-10-CM

## 2018-04-03 DIAGNOSIS — M25.50 ARTHRALGIA, UNSPECIFIED JOINT: ICD-10-CM

## 2018-04-03 DIAGNOSIS — M25.511 CHRONIC RIGHT SHOULDER PAIN: ICD-10-CM

## 2018-04-03 DIAGNOSIS — M15.9 OSTEOARTHROSIS, GENERALIZED, INVOLVING MULTIPLE SITES: ICD-10-CM

## 2018-04-03 DIAGNOSIS — M54.2 NECK PAIN: Primary | ICD-10-CM

## 2018-04-03 PROCEDURE — 97140 MANUAL THERAPY 1/> REGIONS: CPT

## 2018-04-03 NOTE — THERAPY TREATMENT NOTE
Outpatient Physical Therapy Ortho Treatment Note  ASHER Wiley     Patient Name: Roque Lepe  : 1945  MRN: [id number]  Today's Date: 4/3/2018      Visit Date: 2018    Visit Dx:    ICD-10-CM ICD-9-CM   1. Neck pain M54.2 723.1   2. Chronic right shoulder pain M25.511 719.41    G89.29 338.29   3. Rotator cuff dysfunction, right M67.911 726.10   4. Arthralgia, unspecified joint M25.50 719.40   5. Osteoarthrosis, generalized, involving multiple sites M15.9 715.09       There is no problem list on file for this patient.       Past Medical History:   Diagnosis Date   • Arthritis    • Coronary artery disease    • Diabetes mellitus    • HA (headache)    • Hypertension         Past Surgical History:   Procedure Laterality Date   • JOINT REPLACEMENT Bilateral     JENNIFER   • PACEMAKER IMPLANTATION               PT Ortho     Row Name 18 1300       Subjective Comments    Subjective Comments Pt states he feels better for about 3-4 hours after therapy and then with no apparent cause it returns; pt has not called MD regarding appt for follow up/CT scan  -       Precautions and Contraindications    Precautions/Limitations pacemaker  -MH    Precautions NO ES or US secondary to pacemaker  -MH       Subjective Pain    Able to rate subjective pain? yes  -    Pre-Treatment Pain Level 6  -MH    Post-Treatment Pain Level 5  -MH      User Key  (r) = Recorded By, (t) = Taken By, (c) = Cosigned By    Initials Name Provider Type     Rolly Wolff, PTA Physical Therapy Assistant                            PT Assessment/Plan     Row Name 18 1401          PT Assessment    Assessment Comments Pt continues with significant tenderness to palpation (R) UT and along both sides of cervical spine approximate C4 to C7; pt conintues with only temporary relief from symptoms after therapy  -        PT Plan    PT Plan Comments Continue weekly sessions with pt to continue HEP  -MH       User Key  (r) = Recorded By, (t) =  Taken By, (c) = Cosigned By    Initials Name Provider Type     Rolly Wolff PTA Physical Therapy Assistant                Modalities     Row Name 04/03/18 1300             Moist Heat    Location Bilateral UT/shoulders and back with patient supine in hooklying.   with HOB elevated; and to left hip area  -      Rx Minutes Other:   20 minutes  -[facility] S/P Rx Yes  -        User Key  (r) = Recorded By, (t) = Taken By, (c) = Cosigned By    Initials Name Provider Type     Rolly Wolff PTA Physical Therapy Assistant                            Manual Rx (last 36 hours)      Manual Treatments     Row Name 04/03/18 1300             Manual Rx 1    Manual Rx 1 Location Bilateral UT/levator/cervical PS/OA area/deltoid with patient seated in chair  -      Manual Rx 1 Type STM/trigger point massage with patient seated in chair  -      Manual Rx 1 Duration 10 minutes  -         Manual Rx 2    Manual Rx 2 Location gentle manual Cervical traction in sitting  -      Manual Rx 2 Duration 8 x 10 seconds  -         Manual Rx 3    Manual Rx 3 Location gentle passive SCM stretch on right  -      Manual Rx 3 Duration 3 x 10 seconds each  -         Manual Rx 4    Manual Rx 4 Location AA cervical SB   seated; with gentle passive UT stretch  -      Manual Rx 4 Grade gentle  -      Manual Rx 4 Duration 3 x each  -         Manual Rx 5    Manual Rx 5 Location gentle A-P mobs C7- T8 with patient seated.   at spinous processes and transverse processess  -      Manual Rx 5 Duration 2 minutes  -         Manual Rx 7    Manual Rx 7 Location right shoulder distraction  -      Manual Rx 7 Grade gentle   -      Manual Rx 7 Duration 3 x 5 sec   had some discomfort with this  -        User Key  (r) = Recorded By, (t) = Taken By, (c) = Cosigned By    Initials Name Provider Type     Rolly Wolff PTA Physical Therapy Assistant              Therapy Education  Given: Symptoms/condition management  Program:  Reinforced  How Provided: Verbal  Provided to: Patient  Level of Understanding: Teach back education performed, Verbalized              Time Calculation:   Start Time: 1300  Stop Time: 1358  Time Calculation (min): 58 min    Therapy Charges for Today     Code Description Service Date Service Provider Modifiers Qty    71870265928 HC PT HOT OR COLD PACK TREAT MCARE 4/3/2018 Rolly Wolff, PTA GP 1    42438092757 HC PT MANUAL THERAPY EA 15 MIN 4/3/2018 Rolly Wolff PTA GP 1                    Rolly Wolff PTA  4/3/2018

## 2018-04-05 ENCOUNTER — APPOINTMENT (OUTPATIENT)
Dept: PHYSICAL THERAPY | Facility: HOSPITAL | Age: 73
End: 2018-04-05

## 2018-04-10 ENCOUNTER — HOSPITAL ENCOUNTER (OUTPATIENT)
Dept: PHYSICAL THERAPY | Facility: HOSPITAL | Age: 73
Setting detail: THERAPIES SERIES
Discharge: HOME | End: 2018-04-10

## 2018-04-10 DIAGNOSIS — G89.29 CHRONIC RIGHT SHOULDER PAIN: ICD-10-CM

## 2018-04-10 DIAGNOSIS — M25.511 CHRONIC RIGHT SHOULDER PAIN: ICD-10-CM

## 2018-04-10 DIAGNOSIS — M25.50 ARTHRALGIA, UNSPECIFIED JOINT: ICD-10-CM

## 2018-04-10 DIAGNOSIS — M54.2 NECK PAIN: Primary | ICD-10-CM

## 2018-04-10 DIAGNOSIS — M67.911 ROTATOR CUFF DYSFUNCTION, RIGHT: ICD-10-CM

## 2018-04-10 PROCEDURE — 97140 MANUAL THERAPY 1/> REGIONS: CPT

## 2018-04-10 PROCEDURE — 97110 THERAPEUTIC EXERCISES: CPT

## 2018-04-10 NOTE — THERAPY TREATMENT NOTE
"    Outpatient Physical Therapy Ortho Treatment [place]  [place]     Patient Name: Roque Lepe  : 1945  MRN: [id number]  Today's Date: 4/10/2018      Visit Date: 04/10/2018    Visit Dx:    ICD-10-CM ICD-9-CM   1. Neck pain M54.2 723.1   2. Chronic right shoulder pain M25.511 719.41    G89.29 338.29   3. Rotator cuff dysfunction, right M67.911 726.10   4. Arthralgia, unspecified joint M25.50 719.40       There is no problem list on file for this patient.       Past Medical History:   Diagnosis Date   • Arthritis    • Coronary artery disease    • Diabetes mellitus    • HA (headache)    • Hypertension         Past Surgical History:   Procedure Laterality Date   • JOINT REPLACEMENT Bilateral     JENNIFER   • PACEMAKER IMPLANTATION               PT Ortho     Row Name 04/10/18 1200       Subjective Comments    Subjective Comments Patient reports that he woke up at 2:30 am Monday morning and \"my neck felt broke.\"- & had a HA.   Patient still with limited use of right UE and pain into shoulder blade when he turns arm.  \"I'm doing my exercises but I don't have a weight yet.\"   -LN       Precautions and Contraindications    Precautions/Limitations pacemaker  -LN    Precautions NO ES or US secondary to pacemaker  -LN       Subjective Pain    Able to rate subjective pain? yes  -LN    Pre-Treatment Pain Level 5  -LN    Post-Treatment Pain Level 4  -LN    Subjective Pain Comment \"I think my shoulder is getting better and not as sore.\" \"It's in the muscle, not the bone.\"   -LN      User Key  (r) = Recorded By, (t) = Taken By, (c) = Cosigned By    Initials Name Provider Type    LN Daphne Cates, PT Physical Therapist                            PT Assessment/Plan     Row Name 04/10/18 1300          PT Assessment    Assessment Comments Patient with overall decreased pain in shoulder and improved tolerance to PROM and he is progressing well with exercises. Still with minimal muscle tightness with trigger points noted " "right UT/MT/biceps; nominal right deltoid muscle.   -LN        PT Plan    PT Frequency 1x/week  -LN     PT Plan Comments Continue weekly sessions with pt to continue HEP  -LN       User Key  (r) = Recorded By, (t) = Taken By, (c) = Cosigned By    Initials Name Provider Type    ANTONIO Cates, PT Physical Therapist                Modalities     Row Name 04/10/18 1300             Moist Heat    MH Applied Yes  -LN      Location Bilateral UT/shoulders and back with patient supine in hooklying.   with HOB elevated; and to left hip area  -LN      Rx Minutes Other:   20 minutes  -LN      MH S/P Rx Yes  -LN        User Key  (r) = Recorded By, (t) = Taken By, (c) = Cosigned By    Initials Name Provider Type    ANTONIO Cates, PT Physical Therapist                Exercises     Row Name 04/10/18 1300 04/10/18 1200          Subjective Comments    Subjective Comments  -- Patient reports that he woke up at 2:30 am Monday morning and \"my neck felt broke.\"- & had a HA.   Patient still with limited use of right UE and pain into shoulder blade when he turns arm.  \"I'm doing my exercises but I don't have a weight yet.\"   -LN        Subjective Pain    Able to rate subjective pain?  -- yes  -LN     Pre-Treatment Pain Level  -- 5  -LN     Post-Treatment Pain Level  -- 4  -LN     Subjective Pain Comment  -- \"I think my shoulder is getting better and not as sore.\" \"It's in the muscle, not the bone.\"   -LN        Exercise 4    Exercise Name 4 active saws  -LN  --     Cueing 4 Verbal  -LN  --     Reps 4 20  -LN  --     Additional Comments 1#  -LN  --        Exercise 6    Exercise Name 6 active bicep curls  -LN  --     Reps 6 20  -LN  --     Additional Comments 1#  -LN  --     Cueing 6 Demo  -LN  --        Exercise 7    Exercise Name 7 active shoulder ER/IR with elbow at side  -LN  --     Cueing 7 Verbal;Demo  -LN  --     Reps 7 15  -LN  --     Additional Comments 1#  -LN  --        Exercise 8    Exercise Name 8 active " shoulder flexion  -LN  --     Cueing 8 Verbal;Demo  -LN  --     Reps 8 10  -LN  --     Additional Comments no weight  -LN  --       User Key  (r) = Recorded By, (t) = Taken By, (c) = Cosigned By    Initials Name Provider Type    ANTONIO Cates, PT Physical Therapist                        Manual Rx (last 36 hours)      Manual Treatments     Row Name 04/10/18 1300             Manual Rx 1    Manual Rx 1 Location Bilateral UT/levator/cervical PS/OA area/deltoid with patient seated in chair  -LN      Manual Rx 1 Type STM/trigger point massage with patient seated in chair  -LN      Manual Rx 1 Duration 10 minutes  -LN         Manual Rx 2    Manual Rx 2 Location gentle manual Cervical traction in sitting  -LN      Manual Rx 2 Duration 8 x 10 seconds  -LN         Manual Rx 3    Manual Rx 3 Location gentle passive SCM stretch on right  -LN      Manual Rx 3 Duration 3 x 10 seconds each  -LN         Manual Rx 4    Manual Rx 4 Location AA cervical SB   seated; with gentle passive UT stretch  -LN      Manual Rx 4 Grade gentle  -LN      Manual Rx 4 Duration 3 x each  -LN         Manual Rx 5    Manual Rx 5 Location gentle A-P mobs C7- T8 with patient seated.   at spinous processes and transverse processess  -LN      Manual Rx 5 Duration 2 minutes  -LN         Manual Rx 7    Manual Rx 7 Location right shoulder distraction  -LN      Manual Rx 7 Grade gentle   -LN      Manual Rx 7 Duration 3 x 5 sec   had some discomfort with this  -LN         Manual Rx 8    Manual Rx 8 Location right shoulder AAROM/PROM   difficulty getting patient to relax during this  -LN      Manual Rx 8 Type for flexion & abduction/scaption seated, and IR & ER  with patient supine  -LN      Manual Rx 8 Grade gentle- to tolerance  -LN      Manual Rx 8 Duration 10 x each  -LN        User Key  (r) = Recorded By, (t) = Taken By, (c) = Cosigned By    Initials Name Provider Type    ANTONIO Cates, PT Physical Therapist              Therapy  Education  Given: HEP, Symptoms/condition management, Pain management  Program: New (added active shoulder flexion- no weight at this time)  How Provided: Verbal, Demonstration, Written  Provided to: Patient  Level of Understanding: Teach back education performed, Verbalized, Demonstrated              Time Calculation:   Start Time: 1255  Stop Time: 1409  Time Calculation (min): 74 min    Therapy Charges for Today     Code Description Service Date Service Provider Modifiers Qty    95622992256 HC PT HOT OR COLD PACK TREAT MCARE 4/10/2018 Daphne Cates, PT GP 1    53655447769 HC PT MANUAL THERAPY EA 15 MIN 4/10/2018 Daphne Cates, PT GP 2    35766492229 HC PT THER PROC EA 15 MIN 4/10/2018 Dpahne Cates, PT GP 1                    Daphne Cates, PT  4/10/2018

## 2018-04-19 NOTE — THERAPY RE-EVALUATION
Outpatient Physical Therapy Ortho Re-Evaluation of Right Shoulder   [place]     Patient Name: Roque Lepe  : 1945  MRN: [id number]  Today's Date: 3/27/2018      Visit Date: 2018    There is no problem list on file for this patient.       Past Medical History:   Diagnosis Date   • Arthritis    • Coronary artery disease    • Diabetes mellitus    • HA (headache)    • Hypertension         Past Surgical History:   Procedure Laterality Date   • JOINT REPLACEMENT Bilateral     JENNIFER   • PACEMAKER IMPLANTATION         Visit Dx:     ICD-10-CM ICD-9-CM   1. Chronic right shoulder pain M25.511 719.41    G89.29 338.29   2. Rotator cuff dysfunction, right M67.911 726.10                 PT Ortho     Row Name 18 1300       Precautions and Contraindications    Precautions/Limitations pacemaker  -LN    Precautions NO ES or US secondary to pacemaker  -LN       Right Upper Ext    Rt Shoulder Abduction AROM 105  -LN    Rt Shoulder Abduction PROM 140  -LN    Rt Shoulder Flexion AROM 128  -LN    Rt Shoulder Flexion PROM 170  -LN    Rt Shoulder External Rotation AROM 50  -LN    Rt Shoulder External Rotation PROM 60  -LN    Rt Shoulder Internal Rotation AROM 90  -LN    Rt Shoulder Internal Rotation PROM 90  -LN       Right Shoulder (Manual Muscle Testing)    MMT: Flexion, Right Shoulder flexion  -LN    MMT, Gross Movement: Right Shoulder Flexion (4-/5) good minus  -LN    MMT: Extension, Right Shoulder extension  -LN    MMT, Gross Movement: Right Shoulder Extension (4/5) good  -LN    MMT: ABduction, Right Shoulder abduction  -LN    MMT, Gross Movement: Right Shoulder ABduction (4/5) good  -LN    MMT: Internal Rotation, Right Shoulder internal rotation  -LN    MMT, Gross Movement: Right Shoulder Internal Rotation (4/5) good  -LN    MMT: External Rotation, Right Shoulder external rotation  -LN    MMT, Gross Movement: Right Shoulder External Rotation (4-/5) good minus  -LN    Comment, MMT: Right Shoulder adduction 4/5   -LN      User Key  (r) = Recorded By, (t) = Taken By, (c) = Cosigned By    Initials Name Provider Type    LN Daphne Cates, PT Physical Therapist                      Therapy Education  Education Details: Patient to continue with HEP as tolerated and try and use right UE as much as he can without increasing symptoms.   Given: HEP, Symptoms/condition management, Pain management  Program: Reinforced  How Provided: Verbal, Demonstration  Provided to: Patient  Level of Understanding: Teach back education performed, Verbalized, Demonstrated           PT OP Goals     Row Name 03/27/18 1300          PT Short Term Goals    STG Date to Achieve 04/10/18  -LN     STG 1 Patient to verbally report decreased right shoulder pain to <5/10 with ADLs and everyday activities.   -LN     STG 1 Progress Ongoing  -LN     STG 1 Progress Comments at 5/10 today  -LN     STG 2 Patient to have improved right shoulder AROM to 150 degrees flexion, 130 degrees abduction, 90 degrees IR, and 50 degrees ER to improve functional use of right UE with ADLs.   -LN     STG 2 Progress Partially Met  -LN     STG 2 Progress Comments met for shoulder IR/ER  -LN     STG 3 Patient to have improved right shoulder strength by 1/2 muscle grade.   -LN     STG 3 Progress Partially Met  -LN     STG 3 Progress Comments ER improved by 1 muscle grade.   -LN     STG 4 Patient able to perform 10-15 rep of shoulder exercises without c/o increased shoulder pain.   -LN     STG 4 Progress Partially Met  -LN     STG 4 Progress Comments Patient able to perform 10 reps of shoulder exercises but with increased soreness reported.   -LN        Long Term Goals    LTG Date to Achieve 04/24/18  -LN     LTG 1 Patient to verbally report decreased right shoulder pain to 1-2/10 with ADLs and everyday activities.   -LN     LTG 1 Progress Ongoing  -LN     LTG 2 Patient independent with HEP issued by therapist.   -LN     LTG 2 Progress Ongoing;Progressing  -LN     LTG 3 Right  shoulder AROM WFL to allow for good functional use of right UE with ADLs.  -LN     LTG 3 Progress Ongoing  -LN     LTG 4 Nominal tenderness right shoulder and deltoid.   -LN     LTG 4 Progress Ongoing  -LN     LTG 4 Progress Comments Still with minimal to moderate tenderness.  -LN     LTG 5 Increase right  strength to 60#.   -LN     LTG 5 Progress Ongoing  -LN     LTG 5 Progress Comments Right  40# today; increased by 1# but still decreased.  -LN     LTG 6 Patient to have increased right shoulder strength to 4-4+/5.   -LN     LTG 6 Progress Partially Met  -LN     LTG 6 Progress Comments right shoulder strength 4- to 4/5.   -LN        Time Calculation    PT Goal Re-Cert Due Date 04/24/18  -LN       User Key  (r) = Recorded By, (t) = Taken By, (c) = Cosigned By    Initials Name Provider Type    ANTONIO Cates, PT Physical Therapist                PT Assessment/Plan     Row Name 03/27/18 1500          PT Assessment    Assessment Comments Patient continues with c/o pain right UT/deltoid/shoulder/biceps area and still with minimal to moderate tenderness noted.  Patient still with limited right shoulder AROM and decreased shoulder strength which affects his functional use of right UE with ADLs. Patient still with disturbed sleep reported. He has been able to make some progress with his exercises, but feel his overall progress with PT is plateauing.  He has partially met 3 STG and 1 LTG and is making progress towards remaining goals.  Do feel patient would benefit from further testing/CT scan to check for a RCT secondary to showing signs of possible RCT.   -LN        PT Plan    PT Frequency 2x/week  -LN     PT Plan Comments Continue per POC (right shoulder 2x/week and neck 1x/week).  Progress exercises as tolerated. MH PRN.   Continue towards all remaining goals.   -LN       User Key  (r) = Recorded By, (t) = Taken By, (c) = Cosigned By    Initials Name Provider Type    ANTONIO Cates PT  "Physical Therapist                Modalities     Row Name 03/27/18 1300             Moist Heat    MH Applied Yes  -LN      Location Bilateral UT/shoulders and back with patient supine in hooklying.   with HOB elevated; and to left hip area  -LN      Rx Minutes Other:   20 minutes  -LN      MH S/P Rx Yes  -LN        User Key  (r) = Recorded By, (t) = Taken By, (c) = Cosigned By    Initials Name Provider Type    ANTONIO Cates, PT Physical Therapist              Exercises     Row Name 03/27/18 1300             Subjective Comments    Subjective Comments \"I'm about the same.\" He still reports occasional N/T right hand. \"I tried to lift an iron skillet the other day and I couldn't because it hurt too much.\"   -LN         Subjective Pain    Able to rate subjective pain? yes  -LN      Pre-Treatment Pain Level 5  -LN      Post-Treatment Pain Level 4  -LN         Exercise 2    Exercise Name 2 cane exercise for shoulder ER   elbow at side  -LN      Cueing 2 Verbal  -LN      Reps 2 10  -LN      Time (Seconds) 2 5 seconds  -LN         Exercise 3    Exercise Name 3 cane exercise for flexion seated   only able to get to about 90 degrees  -LN      Cueing 3 Verbal  -LN      Reps 3 10  -LN      Time (Seconds) 3 5 seconds  -LN         Exercise 4    Exercise Name 4 active saws  -LN      Cueing 4 Verbal  -LN      Reps 4 15  -LN      Additional Comments 1#  -LN         Exercise 5    Exercise Name 5 cane exercise for scaption seated  -LN      Reps 5 10  -LN      Time (Seconds) 5 5 seconds  -LN         Exercise 6    Exercise Name 6 active bicep curls  -LN      Reps 6 15  -LN      Additional Comments 1#  -LN      Cueing 6 Demo  -LN         Exercise 7    Exercise Name 7 active shoulder ER/IR with elbow at side  -LN      Cueing 7 Verbal;Demo  -LN      Reps 7 10  -LN      Additional Comments 1#  -LN        User Key  (r) = Recorded By, (t) = Taken By, (c) = Cosigned By    Initials Name Provider Type    LN Daphne Cates, PT " Physical Therapist           Manual Rx (last 36 hours)      Manual Treatments     Row Name 03/27/18 1300             Manual Rx 1    Manual Rx 1 Location [id number] [id number] h  -LN        User Key  (r) = Recorded By, (t) = Taken By, (c) = Cosigned By    Initials Name Provider Type    ANTONIO Cates PT Physical Therapist                      Outcome Measure Options: Quick DASH  Quick DASH  Open a tight or new jar.: Moderate Difficulty  Do heavy household chores (e.g., wash walls, wash floors): Severe Difficulty  Carry a shopping bag or briefcase: Mild Difficulty  Wash your back: Severe Difficulty  Use a knife to cut food: No Difficulty  Recreational activities in which you take some force or impact through your arm, should or hand (e.g. golf, hammering, tennis, etc.): Unable  During the past week, to what extent has your arm, shoulder, or hand problem interfered with your normal social activites with family, friends, neighbors or groups?: Moderately  During the past week, were you limited in your work or other regular daily activities as a result of your arm, shoulder or hand problem?: Moderately Limited  Arm, Shoulder, or hand pain: Moderate  Tingling (pins and needles) in your arm, shoulder, or hand: Mild  During the past week, how much difficulty have you had sleeping because of the pain in your arm, shoulder or hand?: Moderate Difficiculty  Number of Questions Answered: 11  Quick DASH Score: 50         Time Calculation:   Start Time: 1300  Stop Time: 1400  Time Calculation (min): 60 min     Therapy Charges for Today     Code Description Service Date Service Provider Modifiers Qty    92551532052 HC PT HOT OR COLD PACK TREAT MCARE 3/27/2018 Daphne Cates, PT GP 1    63369905515 HC PT MANUAL THERAPY EA 15 MIN 3/27/2018 Daphne Cates, PT GP 1    60704114760 HC PT THER PROC EA 15 MIN 3/27/2018 Daphne Cates, PT GP 1          PT G-Codes  Outcome Measure Options: Quick DASH         Daphne  Mavis Cates, PT  3/27/2018       Arabella Graves-daughter

## 2018-05-09 ENCOUNTER — DOCUMENTATION (OUTPATIENT)
Dept: PHYSICAL THERAPY | Facility: HOSPITAL | Age: 73
End: 2018-05-09

## 2018-06-14 ENCOUNTER — DOCUMENTATION (OUTPATIENT)
Dept: PHYSICAL THERAPY | Facility: HOSPITAL | Age: 73
End: 2018-06-14

## 2018-06-14 DIAGNOSIS — M54.2 NECK PAIN: Primary | ICD-10-CM

## 2018-06-14 DIAGNOSIS — R26.9 ALTERED GAIT: ICD-10-CM

## 2018-06-14 DIAGNOSIS — M15.9 OSTEOARTHROSIS, GENERALIZED, INVOLVING MULTIPLE SITES: ICD-10-CM

## 2018-06-14 DIAGNOSIS — M25.511 CHRONIC RIGHT SHOULDER PAIN: ICD-10-CM

## 2018-06-14 DIAGNOSIS — M67.911 ROTATOR CUFF DYSFUNCTION, RIGHT: ICD-10-CM

## 2018-06-14 DIAGNOSIS — G89.29 CHRONIC RIGHT SHOULDER PAIN: ICD-10-CM

## 2018-06-14 DIAGNOSIS — M25.50 ARTHRALGIA, UNSPECIFIED JOINT: ICD-10-CM

## 2018-06-14 NOTE — THERAPY DISCHARGE NOTE
Outpatient Physical Therapy Discharge Summary         Patient Name: Roque Lepe  : 1945  MRN: [id number]    Today's Date: 2018    Visit Dx:    ICD-10-CM ICD-9-CM   1. Neck pain M54.2 723.1   2. Chronic right shoulder pain M25.511 719.41    G89.29 338.29   3. Rotator cuff dysfunction, right M67.911 726.10   4. Arthralgia, unspecified joint M25.50 719.40   5. Osteoarthrosis, generalized, involving multiple sites M15.9 715.09   6. Altered gait R26.9 781.2             PT OP Goals     Row Name 18 1651 18 1600       PT Short Term Goals    STG Date to Achieve - Shoulder goals  -- neck goals     STG 1 Patient to verbally report decreased right shoulder pain to <5/10 with ADLs and everyday activities.   -LN Patient to verbally report decreased pain in neck and shoulders to <5/10 with ADLs and everyday activities.  -LN    STG 1 Progress Met  -LN Met  -LN    STG 1 Progress Comments Patient rated pain at 4-5/10 at last visit.   -LN  --    STG 2 Patient to have improved right shoulder AROM to 150 degrees flexion, 130 degrees abduction, 90 degrees IR, and 50 degrees ER to improve functional use of right UE with ADLs.   -LN Patient to have improved CROM by 25% each plane to allow for improved neck motion with driving and ADLs.  -LN    STG 2 Progress Partially Met  -LN Met  -LN    STG 2 Progress Comments Right shoulder AROM: 128 degrees flexion, 105 degrees abduction, 90 degres IR, & 50 degrees ER; so met for IR & ER.   -LN  --    STG 3 Patient to have improved right shoulder strength by 1/2 muscle grade.   -LN Patient to have decreased c/o HA by 25-50%.   -LN    STG 3 Progress Partially Met  -LN Met  -LN    STG 3 Progress Comments ER improved by 1 muscle grade.   -LN  --    STG 4 Patient able to perform 10-15 rep of shoulder exercises without c/o increased shoulder pain.   -LN Patient to have decreased muscle guarding in neck/UT/midscapula area to minimal to nominal with improved tolerance to massage  and stretching.   -LN    STG 4 Progress Partially Met  -LN Met  -LN    STG 4 Progress Comments Patient able to perform 10 reps of shoulder exercises but with increased soreness reported.   -LN  --    STG 5  -- Patient to report 50% decrease in disturbed sleep secondary to neck pain.   -LN    STG 5 Progress  -- Not Met  -LN    STG 5 Progress Comments  -- He continues to report disturbed sleep but reports it may be a little better.  -LN       Long Term Goals    LTG Date to Achieve 04/24/18  -LN 03/29/18  -LN    LTG 1 Patient to verbally report decreased right shoulder pain to 1-2/10 with ADLs and everyday activities.   -LN Patient to verbally report decreased pain in neck and shoulders to <3/10 with ADLs and everyday activities.   -LN    LTG 1 Progress Not Met  -LN Not Met  -LN    LTG 2 Patient independent with HEP issued by therapist.   -LN CROM WFL all planes.  -LN    LTG 2 Progress Met  -LN Partially Met  -LN    LTG 2 Progress Comments  -- met for cervical flexion and rotation (L); all other motions 75%.   -LN    LTG 3 Right shoulder AROM WFL to allow for good functional use of right UE with ADLs.  -LN Bilateral shoulder ROM WNL and painfree.  -LN    LTG 3 Progress Not Met  -LN Partially Met  -LN    LTG 3 Progress Comments  -- met for left/ see shoulder goals for right shoulder.  -LN    LTG 4 Nominal tenderness right shoulder and deltoid.   -LN Patient independent with HEP issued by therapist.   -LN    LTG 4 Progress Not Met  -LN Met  -LN    LTG 4 Progress Comments still with minimal to moderate tenderness.  -LN  --    LTG 5 Increase right  strength to 60#.   -LN Patient to have decreased c/o HA by %.   -LN    LTG 5 Progress Not Met  -LN Met  -LN    LTG 5 Progress Comments Had not been retested but patient was given putty to work on  strength at home.   -LN  --    LTG 6 Patient to have increased right shoulder strength to 4-4+/5.   -LN Patient able to shoot basketball 10 times with no c/o increased  pain in neck or shoulders.   -LN    LTG 6 Progress Partially Met  -LN Not Met  -LN    LTG 6 Progress Comments met for shoulder extension/abduction/adduction/IR  -LN He had not tried to shoot a basketball yet.   -LN    LTG 7  -- Patient able to turn his neck while driving without any difficulty or pain.   -LN    LTG 7 Progress  -- Partially Met  -LN    LTG 7 Progress Comments  -- Pain and some difficulty with turning to right.   -LN    LTG 8  -- Patient to report 75% decrease in disturbed sleep secondary to neck pain.   -LN    LTG 8 Progress  -- Not Met  -LN    LTG 9  -- Patient to be able to maintain minimal to nominal muscle guarding right/UT/MT/deltoid area.   -LN    LTG 9 Progress  -- Partially Met  -LN    LTG 9 Progress Comments  -- met except for moderate persists in UT area.  -LN      User Key  (r) = Recorded By, (t) = Taken By, (c) = Cosigned By    Initials Name Provider Type    LN Daphne Cates, PT Physical Therapist      Patient was seen for 40 total visits (right shoulder was added on 2/8/2018) for Initial evaluation/multiple re-evaluations; therapeutic exercise with HEP, STM/MFR/trigger point massage; kinesiotape; P/AA/AROM right shoulder, seated manual CTX and gentle AA neck stretching; MH, and patient education.  Patient was able to meet 4 out of 5 neck STG and 2 LTG and partially met 4 neck LTG; and able to meet 1 shoulder STG; partially met 3 STG; met 1 shoulder LTG and partially met 1 LTG.           OP PT Discharge Summary  Date of Discharge: 05/09/18  Reason for Discharge: other (comment) (Patient had to stop coming to PT secondary to insurance issues/ auto insurance ran out. )  Outcomes Achieved: Patient able to partially acheive established goals( see above).   Discharge Instructions/Additional Comments: Patient had HEP to continue with as tolerated and to use MH/CP PRN.       Time Calculation:                      Daphne Cates, PT  6/14/2018
